# Patient Record
Sex: FEMALE | Race: WHITE | Employment: FULL TIME | URBAN - METROPOLITAN AREA
[De-identification: names, ages, dates, MRNs, and addresses within clinical notes are randomized per-mention and may not be internally consistent; named-entity substitution may affect disease eponyms.]

---

## 2017-07-27 ENCOUNTER — HOSPITAL ENCOUNTER (OUTPATIENT)
Dept: GENERAL RADIOLOGY | Age: 67
Discharge: HOME | End: 2017-07-27
Payer: COMMERCIAL

## 2017-07-27 DIAGNOSIS — R06.02 SOB (SHORTNESS OF BREATH): ICD-10-CM

## 2017-07-27 PROCEDURE — 71020 XR CHEST PA LAT: CPT

## 2018-04-12 ENCOUNTER — HOSPITAL ENCOUNTER (OUTPATIENT)
Dept: CT IMAGING | Age: 68
Discharge: HOME | End: 2018-04-12
Attending: OTOLARYNGOLOGY
Payer: COMMERCIAL

## 2018-04-12 DIAGNOSIS — J32.0 CHRONIC MAXILLARY SINUSITIS: ICD-10-CM

## 2018-04-12 PROCEDURE — 70486 CT MAXILLOFACIAL W/O DYE: CPT

## 2018-04-30 ENCOUNTER — OFFICE VISIT (OUTPATIENT)
Dept: NEUROLOGY | Age: 68
End: 2018-04-30

## 2018-04-30 VITALS
SYSTOLIC BLOOD PRESSURE: 130 MMHG | DIASTOLIC BLOOD PRESSURE: 80 MMHG | HEART RATE: 83 BPM | HEIGHT: 63 IN | BODY MASS INDEX: 26.22 KG/M2 | RESPIRATION RATE: 14 BRPM | WEIGHT: 148 LBS | OXYGEN SATURATION: 97 %

## 2018-04-30 DIAGNOSIS — R43.9: Primary | ICD-10-CM

## 2018-04-30 DIAGNOSIS — R43.2 DYSGEUSIA: ICD-10-CM

## 2018-04-30 RX ORDER — LEVOCETIRIZINE DIHYDROCHLORIDE 5 MG/1
TABLET, FILM COATED ORAL
COMMUNITY
End: 2020-11-19

## 2018-04-30 RX ORDER — DICLOFENAC SODIUM 10 MG/G
GEL TOPICAL 4 TIMES DAILY
COMMUNITY

## 2018-04-30 RX ORDER — ERGOCALCIFEROL 1.25 MG/1
50000 CAPSULE ORAL
COMMUNITY

## 2018-04-30 RX ORDER — DICYCLOMINE HYDROCHLORIDE 10 MG/1
10 CAPSULE ORAL
COMMUNITY

## 2018-04-30 RX ORDER — METFORMIN HYDROCHLORIDE 500 MG/1
500 TABLET ORAL
COMMUNITY

## 2018-04-30 RX ORDER — FLUTICASONE PROPIONATE AND SALMETEROL 250; 50 UG/1; UG/1
1 POWDER RESPIRATORY (INHALATION) EVERY 12 HOURS
Status: ON HOLD | COMMUNITY
End: 2020-11-20

## 2018-04-30 RX ORDER — TOPIRAMATE 25 MG/1
TABLET ORAL 2 TIMES DAILY
COMMUNITY
End: 2020-11-19

## 2018-04-30 RX ORDER — ONDANSETRON 4 MG/1
4 TABLET, ORALLY DISINTEGRATING ORAL
COMMUNITY
End: 2018-12-22

## 2018-04-30 RX ORDER — CYANOCOBALAMIN 1000 UG/ML
1000 INJECTION, SOLUTION INTRAMUSCULAR; SUBCUTANEOUS
COMMUNITY

## 2018-04-30 RX ORDER — LEVOTHYROXINE SODIUM 88 UG/1
75 TABLET ORAL
COMMUNITY

## 2018-04-30 RX ORDER — AZELASTINE HCL 205.5 UG/1
SPRAY NASAL 2 TIMES DAILY
COMMUNITY

## 2018-04-30 RX ORDER — ROSUVASTATIN CALCIUM 20 MG/1
20 TABLET, COATED ORAL
COMMUNITY

## 2018-04-30 RX ORDER — MOMETASONE FUROATE 50 UG/1
2 SPRAY, METERED NASAL DAILY
COMMUNITY

## 2018-04-30 NOTE — PROGRESS NOTES
Chief Complaint   Patient presents with    Neurologic Problem     Taste and smell changes       Referred by: Dr. Arik Hess is a 69-year-old woman with a history of prediabetes and thyroid dysfunction as well as migraine headaches who is here to discuss new changes. She was referred by Dr. Juan Knox in ENT. The patient tells me that for the past 6 months she has had a change in her taste and smell such that she constantly smells a moldy musty stale order at all times. She also feels like her taste is changed such that she no longer has pleasure eating. She thought it was her metformin at one point and had the medication change to no improvement. She saw ENT and has a deviated septum with recommendation for some mild sinus surgery. She denies any unusual headaches or vision changes or personality changes. No weakness in the arms or legs. Review of Systems   HENT:        Taste changes and smell changes   All other systems reviewed and are negative. Past Medical History:   Diagnosis Date    Asthma     Thyroid disease      Family History   Problem Relation Age of Onset    Cancer Mother     Heart Disease Mother      Social History     Social History    Marital status:      Spouse name: N/A    Number of children: N/A    Years of education: N/A     Occupational History    Not on file. Social History Main Topics    Smoking status: Never Smoker    Smokeless tobacco: Never Used    Alcohol use No    Drug use: Not on file    Sexual activity: Not on file     Other Topics Concern    Not on file     Social History Narrative    No narrative on file     Current Outpatient Prescriptions   Medication Sig    fluticasone-salmeterol (ADVAIR DISKUS) 250-50 mcg/dose diskus inhaler Take 1 Puff by inhalation every twelve (12) hours.  mometasone (NASONEX) 50 mcg/actuation nasal spray 2 Sprays daily.  levothyroxine (SYNTHROID) 88 mcg tablet Take  by mouth Daily (before breakfast).     rosuvastatin (CRESTOR) 20 mg tablet Take 20 mg by mouth nightly.  topiramate (TOPAMAX) 25 mg tablet Take  by mouth two (2) times a day.  metFORMIN (GLUCOPHAGE) 500 mg tablet Take  by mouth two (2) times daily (with meals).  dulaglutide (TRULICITY) 1.5 NIYAH/8.9 mL sub-q pen 1.5 mg by SubCUTAneous route every seven (7) days.  diclofenac (VOLTAREN) 1 % gel Apply  to affected area four (4) times daily.  ondansetron (ZOFRAN ODT) 4 mg disintegrating tablet Take 4 mg by mouth every eight (8) hours as needed for Nausea.  dicyclomine (BENTYL) 10 mg capsule Take 10 mg by mouth nightly as needed.  ergocalciferol (VITAMIN D2) 50,000 unit capsule Take 50,000 Units by mouth.  cyanocobalamin (VITAMIN B-12) 1,000 mcg/mL injection 1,000 mcg by IntraMUSCular route once.  levocetirizine (XYZAL) 5 mg tablet Take  by mouth.  Azelastine (ASTEPRO) 0.15 % (205.5 mcg) nasal spray two (2) times a day.  topiramate ER (TROKENDI XR) 100 mg capsule Take 1 Cap by mouth daily. No current facility-administered medications for this visit. Allergies   Allergen Reactions    Codeine Nausea and Vomiting    Penicillins Hives    Tetracyclines Hives         Neurologic Exam     Mental Status   Oriented to person, place, and time. Cranial Nerves   Cranial nerves II through XII intact. Motor Exam   Muscle bulk: normal    Strength   Strength 5/5 throughout. Sensory Exam   Light touch normal.     Gait, Coordination, and Reflexes     Gait  Gait: normal    Coordination   Romberg: negative    Tremor   Resting tremor: absent    Reflexes   Right brachioradialis: 2+  Left brachioradialis: 2+  Right biceps: 2+  Left biceps: 2+  Right triceps: 2+  Left triceps: 2+  Right patellar: 2+  Left patellar: 2+  Right achilles: 2+  Left achilles: 2+  Right : 2+  Left : 2+    Physical Exam   Constitutional: She is oriented to person, place, and time. She appears well-developed and well-nourished.    Cardiovascular: Normal rate. Pulmonary/Chest: Effort normal.   Neurological: She is oriented to person, place, and time. She has normal strength. She has a normal Romberg Test. Gait normal.   Reflex Scores:       Tricep reflexes are 2+ on the right side and 2+ on the left side. Bicep reflexes are 2+ on the right side and 2+ on the left side. Brachioradialis reflexes are 2+ on the right side and 2+ on the left side. Patellar reflexes are 2+ on the right side and 2+ on the left side. Achilles reflexes are 2+ on the right side and 2+ on the left side. Skin: Skin is warm and dry. Psychiatric: She has a normal mood and affect. Her behavior is normal.   Vitals reviewed. Visit Vitals    /80    Pulse 83    Resp 14    Ht 5' 3.25\" (1.607 m)    Wt 67.1 kg (148 lb)    SpO2 97%    BMI 26.01 kg/m2       Lab Results  Component Value Date/Time   WBC 5.3 02/15/2010 08:35 AM   HGB 12.2 02/15/2010 08:35 AM   HCT 38.4 02/15/2010 08:35 AM   PLATELET 957 01/37/6090 08:35 AM   MCV 90.8 02/15/2010 08:35 AM     Lab Results  Component Value Date/Time   Glucose 78 05/11/2009 07:59 PM   LDL, calculated 119.2 (H) 02/15/2010 08:35 AM   Creatinine (POC) 0.9 09/23/2015 08:14 AM   Creatinine 0.8 05/11/2009 07:59 PM      Lab Results  Component Value Date/Time   Cholesterol, total 221 (H) 02/15/2010 08:35 AM   HDL Cholesterol 69 (H) 02/15/2010 08:35 AM   LDL, calculated 119.2 (H) 02/15/2010 08:35 AM   Triglyceride 164 02/15/2010 08:35 AM   CHOL/HDL Ratio 3.2 02/15/2010 08:35 AM     Lab Results  Component Value Date/Time   ALT (SGPT) 37 05/11/2009 07:59 PM   AST (SGOT) 15 05/11/2009 07:59 PM   Alk. phosphatase 98 05/11/2009 07:59 PM   Bilirubin, total 0.4 05/11/2009 07:59 PM   Albumin 3.9 05/11/2009 07:59 PM   Protein, total 7.0 05/11/2009 07:59 PM   PLATELET 878 27/60/2534 08:35 AM          CT Results (maximum last 3):     Results from [facility] encounter on 04/12/18   CT SINUSES WO CONT   Narrative EXAM:  CT SINUSES WO CONT    INDICATION:  Chronic maxillary sinusitis    COMPARISON: None. CONTRAST: None. TECHNIQUE:   Multislice helical CT of the paranasal sinuses was performed in the axial plane  without intravenous contrast administration. Coronal and sagittal reformations  were generated. CT dose reduction was achieved through use of a standardized  protocol tailored for this examination and automatic exposure control for dose  modulation. FINDINGS:   There is a small osteoma in the left frontal sinus. The frontal sinuses and  frontoethmoidal recesses are otherwise clear. The anterior ethmoid air cells are clear. The maxillary sinuses are clear. The maxillary sinus infundibula and  ostiomeatal units are patent. The posterior ethmoid air cells and sphenoid sinus are clear. The nasal cavity is clear. The nasal septum is mildly deviated to the left. There is no bone destruction or bone dehiscence of the paranasal sinuses. Impression IMPRESSION: No evidence of acute or chronic sinusitis. Mild leftward deviation  of the nasal septum. Results from [place] encounter on 09/23/15   CT ABD PELV W CONT   Narrative **Final Report**      ICD Codes / Adm. Diagnosis: 787.91  789.04 / Diarrhea  Abdominal pain, left   lower felicitas  Examination:  CT ABD PELVIS W CON  - [id number] - Sep 23 2015  8:28AM  Accession No:  [id number]  Reason:  Diarrhea      REPORT:  Clinical indication: Diarrhea. Axial CT scan of the abdomen and pelvis obtained after intravenous injection   of 99 cc of Isovue-370 and after oral contrast, comparison to 2012   examination. Liver and spleen are normal in size. Gallbladder, pancreas and adrenals   appear unremarkable. There is no free air or free fluid, lung bases appear   unremarkable. Major vessels appear patent. Nonobstructing left renal   calculi. Unchanged large right central renal cyst. There is sigmoid   diverticulosis.  The appendix appears normal. There is no bowel wall   thickening or obstruction. There is no adenopathy in the abdomen or pelvis. The bladder is midline not filled. There has been a prior hysterectomy. IMPRESSION: Sigmoid diverticulosis. Nonobstructing left intrarenal calculi. Signing/Reading Doctor: Kateri Burkitt ([id number])    Approved: Kateri Burkitt ([id number])  Sep 23 2015  9:46AM                                     MRI Results (maximum last 3): No results found for this or any previous visit. PET Results (maximum last 3): No results found for this or any previous visit. Assessment and Plan   Diagnoses and all orders for this visit:    1. Olfaction disorder    2. Dysgeusia    Other orders  -     topiramate ER (TROKENDI XR) 100 mg capsule; Take 1 Cap by mouth daily. 48-year-old woman having change in smell as well as taste which makes be suspicious for possibly medication effect from topiramate  Which is well known to cause alteration in taste in a negative way. I am going to give her a trial of extended release Trokendi once a day at this point and see if this improves any of her symptoms by removing the generic topiramate. Her examination is without any focal findings. I think it is reasonable to see how she responds to medication change before considering imaging. She agrees. She will let me know in the next 7-10 days if there is been any change. A notice of this visit/encounter being completed has been sent electronically to the patient's PCP and/or referring provider.      [address] [place] [address], [address]  Diplomate KATI

## 2018-04-30 NOTE — PATIENT INSTRUCTIONS

## 2018-12-22 ENCOUNTER — HOSPITAL ENCOUNTER (EMERGENCY)
Age: 68
Discharge: HOME | End: 2018-12-22
Attending: EMERGENCY MEDICINE
Payer: COMMERCIAL

## 2018-12-22 ENCOUNTER — APPOINTMENT (OUTPATIENT)
Dept: CT IMAGING | Age: 68
End: 2018-12-22
Payer: COMMERCIAL

## 2018-12-22 VITALS
HEART RATE: 66 BPM | DIASTOLIC BLOOD PRESSURE: 76 MMHG | HEIGHT: 63 IN | RESPIRATION RATE: 16 BRPM | TEMPERATURE: 98.2 F | BODY MASS INDEX: 24.8 KG/M2 | WEIGHT: 139.99 LBS | OXYGEN SATURATION: 99 % | SYSTOLIC BLOOD PRESSURE: 119 MMHG

## 2018-12-22 DIAGNOSIS — S09.90XA INJURY OF HEAD, INITIAL ENCOUNTER: Primary | ICD-10-CM

## 2018-12-22 DIAGNOSIS — F07.81 POST CONCUSSION SYNDROME: ICD-10-CM

## 2018-12-22 PROCEDURE — 74011250636 HC RX REV CODE- 250/636: Performed by: PHYSICIAN ASSISTANT

## 2018-12-22 PROCEDURE — 74011250637 HC RX REV CODE- 250/637: Performed by: PHYSICIAN ASSISTANT

## 2018-12-22 PROCEDURE — 99283 EMERGENCY DEPT VISIT LOW MDM: CPT

## 2018-12-22 PROCEDURE — 70450 CT HEAD/BRAIN W/O DYE: CPT

## 2018-12-22 RX ORDER — ONDANSETRON 4 MG/1
4 TABLET, ORALLY DISINTEGRATING ORAL
Status: COMPLETED | OUTPATIENT
Start: 2018-12-22 | End: 2018-12-22

## 2018-12-22 RX ORDER — MECLIZINE HCL 12.5 MG 12.5 MG/1
25 TABLET ORAL
Status: COMPLETED | OUTPATIENT
Start: 2018-12-22 | End: 2018-12-22

## 2018-12-22 RX ORDER — ONDANSETRON 4 MG/1
4 TABLET, ORALLY DISINTEGRATING ORAL
Qty: 10 TAB | Refills: 0 | Status: SHIPPED | OUTPATIENT
Start: 2018-12-22

## 2018-12-22 RX ORDER — BUTALBITAL, ACETAMINOPHEN AND CAFFEINE 50; 325; 40 MG/1; MG/1; MG/1
1 TABLET ORAL
Status: COMPLETED | OUTPATIENT
Start: 2018-12-22 | End: 2018-12-22

## 2018-12-22 RX ORDER — MECLIZINE HYDROCHLORIDE 25 MG/1
25 TABLET ORAL
Qty: 15 TAB | Refills: 0 | Status: SHIPPED | OUTPATIENT
Start: 2018-12-22 | End: 2020-11-19

## 2018-12-22 RX ORDER — AMITRIPTYLINE HYDROCHLORIDE 25 MG/1
TABLET, FILM COATED ORAL
Qty: 15 TAB | Refills: 0 | Status: SHIPPED | OUTPATIENT
Start: 2018-12-22 | End: 2020-11-19

## 2018-12-22 RX ORDER — IBUPROFEN 600 MG/1
600 TABLET ORAL
Qty: 15 TAB | Refills: 0 | Status: SHIPPED | OUTPATIENT
Start: 2018-12-22 | End: 2020-11-19

## 2018-12-22 RX ADMIN — MECLIZINE 25 MG: 12.5 TABLET ORAL at 13:26

## 2018-12-22 RX ADMIN — ONDANSETRON 4 MG: 4 TABLET, ORALLY DISINTEGRATING ORAL at 12:12

## 2018-12-22 RX ADMIN — BUTALBITAL, ACETAMINOPHEN AND CAFFEINE 1 TABLET: 50; 325; 40 TABLET ORAL at 12:12
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
## 2018-12-22 NOTE — DISCHARGE INSTRUCTIONS
Rest, push fluids, ice/elevation as tolerated. Follow up with your primary care for recheck. Contact information provided for Neurologist, if symptoms persist.  Return to the Emergency Dept for any worsening headache, confusion, nausea/vomiting, visual changes, or weakness. Learning About a Closed Head Injury  What is a closed head injury? A closed head injury happens when your head gets hit hard. The strong force of the blow causes your brain to shake in your skull. This movement can cause the brain to bruise, swell, or tear. Sometimes nerves or blood vessels also get damaged. This can cause bleeding in or around the brain. A concussion is a type of closed head injury. What are the symptoms? If you have a mild concussion, you may have a mild headache or feel \"not quite right. \" These symptoms are common. They usually go away over a few days to 4 weeks. But sometimes after a concussion, you feel like you can't function as well as before the injury. And you have new symptoms. This is called postconcussive syndrome. You may:  · Find it harder to solve problems, think, concentrate, or remember. · Have headaches. · Have changes in your sleep patterns, such as not being able to sleep or sleeping all the time. · Have changes in your personality. · Not be interested in your usual activities. · Feel angry or anxious without a clear reason. · Lose your sense of taste or smell. · Be dizzy, lightheaded, or unsteady. It may be hard to stand or walk. How is a closed head injury treated? Any person who may have a concussion needs to see a doctor. Some people have to stay in the hospital to be watched. Others can go home safely. If you go home, follow your doctor's instructions. He or she will tell you if you need someone to watch you closely for the next 24 hours or longer. Rest is the best treatment. Get plenty of sleep at night. And try to rest during the day.   · Avoid activities that are physically or mentally demanding. These include housework, exercise, and schoolwork. And don't play video games, send text messages, or use the computer. You may need to change your school or work schedule to be able to avoid these activities. · Ask your doctor when it's okay to drive, ride a bike, or operate machinery. · Take an over-the-counter pain medicine, such as acetaminophen (Tylenol), ibuprofen (Advil, Motrin), or naproxen (Aleve). Be safe with medicines. Read and follow all instructions on the label. · Check with your doctor before you use any other medicines for pain. · Do not drink alcohol or use illegal drugs. They can slow recovery. They can also increase your risk of getting a second head injury. Follow-up care is a key part of your treatment and safety. Be sure to make and go to all appointments, and call your doctor if you are having problems. It's also a good idea to know your test results and keep a list of the medicines you take. Where can you learn more? Go to http://silvia-onur.info/. Enter E235 in the search box to learn more about \"Learning About a Closed Head Injury. \"  Current as of: June 4, 2018  Content Version: 11.8  © 4043-2072 [employer]. Care instructions adapted under license by [employer] (which disclaims liability or warranty for this information). If you have questions about a medical condition or this instruction, always ask your healthcare professional. [place] [address] any warranty or liability for your use of this information. Postconcussion Syndrome: Care Instructions  Your Care Instructions    Postconcussion syndrome occurs after a blow to the head or body. Common symptoms are changes in the ability to concentrate, think, remember, or solve problems.  Symptoms, which may include headaches, personality changes, and dizziness, may be related to stress from the events surrounding the accident that caused the injury. Follow-up care is a key part of your treatment and safety. Be sure to make and go to all appointments, and call your doctor if you are having problems. It's also a good idea to know your test results and keep a list of the medicines you take. How can you care for yourself at home? Pain  · Rest is the best treatment for postconcussion syndrome. · Do not drive if you have taken a prescription pain medicine. · Rest in a quiet, dark room until your headache is gone. Close your eyes and try to relax or go to sleep. Do not watch TV or read. · Put a cold, moist cloth or cold pack on the painful area for 10 to 20 minutes at a time. Put a thin cloth between the cold pack and your skin. · Have someone gently massage your neck and shoulders. · Take your medicines exactly as prescribed. Call your doctor if you think you are having a problem with your medicine. You will get more details on the specific medicines your doctor prescribes. Stress  · Try to reduce stress. Some ways to do this include:  ? Taking slow, deep breaths. ? Soaking in a warm bath. ? Listening to soothing music. ? Taking a yoga class. ? Having a massage or back rub. ? Drinking a warm, nonalcoholic, noncaffeinated beverage. · Get enough sleep. · Eat a healthy, balanced diet. A balanced diet includes whole grains, dairy, fruits and vegetables, and protein. Eat a variety of foods from each of those groups so you get all the nutrients you need. · Avoid alcohol and illegal drugs. · Try relaxation exercises, such as breathing and muscle relaxation exercises. · Talk to your doctor about counseling. It may help you deal with stress from your accident. When should you call for help? Watch closely for changes in your health, and be sure to contact your doctor if:    · You do not get better as expected.     · Your symptoms, such as headaches, trouble concentrating, or changes in mood, get worse. Where can you learn more?   Go to http://silvia-onur.info/. Enter R292 in the search box to learn more about \"Postconcussion Syndrome: Care Instructions. \"  Current as of: September 10, 2017  Content Version: 11.8  © 9192-6924 [employer]. Care instructions adapted under license by [employer] (which disclaims liability or warranty for this information). If you have questions about a medical condition or this instruction, always ask your healthcare professional. [address] any warranty or liability for your use of this information.

## 2020-11-16 ENCOUNTER — HOSPITAL ENCOUNTER (OUTPATIENT)
Dept: PREADMISSION TESTING | Age: 70
Discharge: HOME | End: 2020-11-16
Payer: MEDICARE

## 2020-11-16 PROCEDURE — 87635 SARS-COV-2 COVID-19 AMP PRB: CPT

## 2020-11-17 LAB
HEALTH STATUS, XMCV2T: NORMAL
SARS-COV-2, COV2NT: NOT DETECTED
SOURCE, COVRS: NORMAL
SPECIMEN SOURCE, FCOV2M: NORMAL
SPECIMEN TYPE, XMCV1T: NORMAL

## 2020-11-19 RX ORDER — OMEPRAZOLE 40 MG/1
40 CAPSULE, DELAYED RELEASE ORAL DAILY
Status: ON HOLD | COMMUNITY
End: 2020-11-20

## 2020-11-19 RX ORDER — FREMANEZUMAB-VFRM 225 MG/1.5ML
INJECTION SUBCUTANEOUS
COMMUNITY

## 2020-11-19 RX ORDER — ESTRADIOL 0.1 MG/D
1 FILM, EXTENDED RELEASE TRANSDERMAL
COMMUNITY

## 2020-11-19 RX ORDER — CHOLECALCIFEROL (VITAMIN D3) 125 MCG
100 CAPSULE ORAL DAILY
COMMUNITY

## 2020-11-19 RX ORDER — FLUTICASONE PROPIONATE 100 UG/1
POWDER, METERED RESPIRATORY (INHALATION) 2 TIMES DAILY
COMMUNITY

## 2020-11-19 RX ORDER — ESTRADIOL 0.03 MG/D
FILM, EXTENDED RELEASE TRANSDERMAL
COMMUNITY

## 2020-11-20 ENCOUNTER — ANESTHESIA (OUTPATIENT)
Dept: ENDOSCOPY | Age: 70
End: 2020-11-20
Payer: MEDICARE

## 2020-11-20 ENCOUNTER — HOSPITAL ENCOUNTER (OUTPATIENT)
Age: 70
Setting detail: OUTPATIENT SURGERY
Discharge: HOME | End: 2020-11-20
Attending: SPECIALIST | Admitting: SPECIALIST
Payer: MEDICARE

## 2020-11-20 ENCOUNTER — ANESTHESIA EVENT (OUTPATIENT)
Dept: ENDOSCOPY | Age: 70
End: 2020-11-20
Payer: MEDICARE

## 2020-11-20 VITALS
OXYGEN SATURATION: 100 % | BODY MASS INDEX: 23.39 KG/M2 | HEIGHT: 63 IN | HEART RATE: 83 BPM | WEIGHT: 132 LBS | RESPIRATION RATE: 14 BRPM | DIASTOLIC BLOOD PRESSURE: 73 MMHG | TEMPERATURE: 98 F | SYSTOLIC BLOOD PRESSURE: 126 MMHG

## 2020-11-20 PROCEDURE — 2709999900 HC NON-CHARGEABLE SUPPLY: Performed by: SPECIALIST

## 2020-11-20 PROCEDURE — 74011250636 HC RX REV CODE- 250/636: Performed by: SPECIALIST

## 2020-11-20 PROCEDURE — 88305 TISSUE EXAM BY PATHOLOGIST: CPT

## 2020-11-20 PROCEDURE — 74011000250 HC RX REV CODE- 250: Performed by: REGISTERED NURSE

## 2020-11-20 PROCEDURE — 76060000031 HC ANESTHESIA FIRST 0.5 HR: Performed by: SPECIALIST

## 2020-11-20 PROCEDURE — 74011250636 HC RX REV CODE- 250/636: Performed by: REGISTERED NURSE

## 2020-11-20 PROCEDURE — 76040000019: Performed by: SPECIALIST

## 2020-11-20 PROCEDURE — 77030013992 HC SNR POLYP ENDOSC BSC -B: Performed by: SPECIALIST

## 2020-11-20 RX ORDER — PANTOPRAZOLE SODIUM 20 MG/1
20 TABLET, DELAYED RELEASE ORAL DAILY
COMMUNITY

## 2020-11-20 RX ORDER — DEXTROMETHORPHAN/PSEUDOEPHED 2.5-7.5/.8
1.2 DROPS ORAL
Status: DISCONTINUED | OUTPATIENT
Start: 2020-11-20 | End: 2020-11-20 | Stop reason: HOSPADM

## 2020-11-20 RX ORDER — PROPOFOL 10 MG/ML
INJECTION, EMULSION INTRAVENOUS AS NEEDED
Status: DISCONTINUED | OUTPATIENT
Start: 2020-11-20 | End: 2020-11-20 | Stop reason: HOSPADM

## 2020-11-20 RX ORDER — SODIUM CHLORIDE 9 MG/ML
50 INJECTION, SOLUTION INTRAVENOUS CONTINUOUS
Status: DISCONTINUED | OUTPATIENT
Start: 2020-11-20 | End: 2020-11-20 | Stop reason: HOSPADM

## 2020-11-20 RX ORDER — SODIUM CHLORIDE 0.9 % (FLUSH) 0.9 %
5-40 SYRINGE (ML) INJECTION AS NEEDED
Status: DISCONTINUED | OUTPATIENT
Start: 2020-11-20 | End: 2020-11-20 | Stop reason: HOSPADM

## 2020-11-20 RX ORDER — SODIUM CHLORIDE 0.9 % (FLUSH) 0.9 %
5-40 SYRINGE (ML) INJECTION EVERY 8 HOURS
Status: DISCONTINUED | OUTPATIENT
Start: 2020-11-20 | End: 2020-11-20 | Stop reason: HOSPADM

## 2020-11-20 RX ORDER — LIDOCAINE HYDROCHLORIDE 20 MG/ML
INJECTION, SOLUTION EPIDURAL; INFILTRATION; INTRACAUDAL; PERINEURAL AS NEEDED
Status: DISCONTINUED | OUTPATIENT
Start: 2020-11-20 | End: 2020-11-20 | Stop reason: HOSPADM

## 2020-11-20 RX ADMIN — LIDOCAINE HYDROCHLORIDE 40 MG: 20 INJECTION, SOLUTION EPIDURAL; INFILTRATION; INTRACAUDAL; PERINEURAL at 08:34

## 2020-11-20 RX ADMIN — SODIUM CHLORIDE: 900 INJECTION, SOLUTION INTRAVENOUS at 08:35

## 2020-11-20 RX ADMIN — PROPOFOL 30 MG: 10 INJECTION, EMULSION INTRAVENOUS at 08:43

## 2020-11-20 RX ADMIN — PROPOFOL 60 MG: 10 INJECTION, EMULSION INTRAVENOUS at 08:34

## 2020-11-20 RX ADMIN — PROPOFOL 20 MG: 10 INJECTION, EMULSION INTRAVENOUS at 08:36

## 2020-11-20 RX ADMIN — PROPOFOL 30 MG: 10 INJECTION, EMULSION INTRAVENOUS at 08:40

## 2020-11-20 RX ADMIN — PROPOFOL 20 MG: 10 INJECTION, EMULSION INTRAVENOUS at 08:48

## 2020-11-20 RX ADMIN — PROPOFOL 20 MG: 10 INJECTION, EMULSION INTRAVENOUS at 08:38

## 2020-11-20 RX ADMIN — PROPOFOL 20 MG: 10 INJECTION, EMULSION INTRAVENOUS at 08:52

## 2020-11-20 RX ADMIN — PROPOFOL 20 MG: 10 INJECTION, EMULSION INTRAVENOUS at 08:45

## 2020-11-20 NOTE — ROUTINE PROCESS
[facility] 1950 
[id number] Situation: 
Verbal report received from: Nia Rueda RN Procedure: Procedure(s): 
COLONOSCOPY 
ENDOSCOPIC POLYPECTOMY Background: 
 
Preoperative diagnosis: CHRONIC CONSTIPATION Postoperative diagnosis: diverticulosis, polyps :  Dr. Verónica Driscoll Assistant(s): Endoscopy Technician-1: Rodri Grier Endoscopy RN-1: Adriana Hamilton RN Specimens:  
ID Type Source Tests Collected by Time Destination 1 : transverse colon polyps  Preservative   Corazon Wilson MD 2020 5280 Pathology 2 : proximal ascending colon polyp Preservative Colon, Ascending  Corazon Wilson MD 2020 3301 Pathology 3 : splenic flexure polyp Preservative   Corazon Wilson MD 2020 1972 Pathology H. Pylori  no Assessment: 
Intra-procedure medications Anesthesia gave intra-procedure sedation and medications, see anesthesia flow sheet yes Intravenous fluids: NS@ Yeniferadrian Gant Vital signs stable Abdominal assessment: round and soft Recommendation: 
Discharge patient per MD order. Family or Friend Permission to share finding with family or friend yes

## 2020-11-20 NOTE — ANESTHESIA POSTPROCEDURE EVALUATION
Procedure(s):  COLONOSCOPY  ENDOSCOPIC POLYPECTOMY. total IV anesthesia, general    Anesthesia Post Evaluation        Patient location during evaluation: PACU  Note status: Adequate. Level of consciousness: responsive to verbal stimuli and sleepy but conscious  Pain management: satisfactory to patient  Airway patency: patent  Anesthetic complications: no  Cardiovascular status: acceptable  Respiratory status: acceptable  Hydration status: acceptable  Comments: +Post-Anesthesia Evaluation and Assessment    Patient: Isaac Torres MRN: [id number]  SSN: [id number]   YOB: 1950  Age: 69 y.o. Sex: female      Cardiovascular Function/Vital Signs    /73   Pulse 83   Temp 36.7 °C (98 °F)   Resp 14   Ht 5' 3.25\" (1.607 m)   Wt 59.9 kg (132 lb)   SpO2 100%   Breastfeeding No   BMI 23.20 kg/m²     Patient is status post Procedure(s):  COLONOSCOPY  ENDOSCOPIC POLYPECTOMY. Nausea/Vomiting: Controlled. Postoperative hydration reviewed and adequate. Pain:  Pain Scale 1: Numeric (0 - 10) (11/20/20 6621)  Pain Intensity 1: 0 (11/20/20 7365)   Managed. Neurological Status: At baseline. Mental Status and Level of Consciousness: Arousable. Pulmonary Status:   O2 Device: Room air (11/20/20 6324)   Adequate oxygenation and airway patent. Complications related to anesthesia: None    Post-anesthesia assessment completed. No concerns. Signed By: Monica Uribe MD    11/20/2020  Post anesthesia nausea and vomiting:  controlled      INITIAL Post-op Vital signs:   Vitals Value Taken Time   /70 11/20/2020  9:21 AM   Temp     Pulse 79 11/20/2020  9:22 AM   Resp 16 11/20/2020  9:22 AM   SpO2 100 % 11/20/2020  9:22 AM   Vitals shown include unvalidated device data.

## 2020-11-20 NOTE — PROCEDURES
Colonoscopy Procedure Note    Indications:   Personal history of colon polyps (screening only)    Referring Physician: Rody Lugo MD  Anesthesia/Sedation: MAC anesthesia Propofol  Endoscopist:  Dr. Yasmani Osuna    Procedure in Detail:  Informed consent was obtained for the procedure, including sedation. Risks of perforation, hemorrhage, adverse drug reaction, and aspiration were discussed. The patient was placed in the left lateral decubitus position. Based on the pre-procedure assessment, including review of the patient's medical history, medications, allergies, and review of systems, she had been deemed to be an appropriate candidate for moderate sedation; she was therefore sedated with the medications listed above. The patient was monitored continuously with ECG tracing, pulse oximetry, blood pressure monitoring, and direct observations. A rectal examination was performed. The [id number] was inserted into the rectum and advanced under direct vision to the cecum, which was identified by the ileocecal valve and appendiceal orifice. The quality of the colonic preparation was adequate. A careful inspection was made as the colonoscope was withdrawn, including a retroflexed view of the rectum; findings and interventions are described below. Appropriate photodocumentation was obtained. Findings:     1. Scope advanced to the cecum and terminal ileum. 2.  (2) sessile polyps in the transverse colon 5 mm in size s/p cold snare removal       (1) sessile 1 cm polyp in the proximal ascending colon s/p hot snare removal       (1) sessile polyp 4 mm in the area of splenic flexure s/p cold snare removal  3. Diffuse L sided diverticulosis. 4.  Small internal hemorrhoids. Therapies:  See above    Specimen: Specimens were collected as described above and sent to pathology. Complications: None were encountered during the procedure. EBL: < 10 ml.     Recommendations:   -f/u path  -repeat colonoscopy in 3 years    Signed By: Mattie Siddiqi MD                        November 20, 2020

## 2020-11-20 NOTE — H&P
Gastroenterology Outpatient History and Physical    Patient: Americo Pradhanorest    Physician: Reno Mccall MD    Vital Signs: Blood pressure 113/70, pulse 90, temperature 98 °F (36.7 °C), resp. rate 23, height 5' 3.25\" (1.607 m), weight 59.9 kg (132 lb), SpO2 99 %, not currently breastfeeding. Allergies: Allergies   Allergen Reactions    Codeine Nausea and Vomiting    Penicillins Hives    Saline Spray [Sodium Chloride] Other (comments)     Causes sneezing    Tetracyclines Hives       Chief Complaint: H/O Polyp    History of Present Illness: 68 yo WF for h/o polyps    Justification for Procedure: above    History:  Past Medical History:   Diagnosis Date    Arthritis     Asthma     GERD (gastroesophageal reflux disease)     Ill-defined condition     chronic migraines    Nausea & vomiting     Thyroid disease       Past Surgical History:   Procedure Laterality Date    HX GYN      hysterectomy    HX HEENT      septal tubinplasty    HX OTHER SURGICAL      piliondial cyst      Social History     Socioeconomic History    Marital status:      Spouse name: Not on file    Number of children: Not on file    Years of education: Not on file    Highest education level: Not on file   Tobacco Use    Smoking status: Never Smoker    Smokeless tobacco: Never Used   Substance and Sexual Activity    Alcohol use: Yes     Comment: socially    Drug use: Never      Family History   Problem Relation Age of Onset    Cancer Mother     Heart Disease Mother        Medications:   Prior to Admission medications    Medication Sig Start Date End Date Taking? Authorizing Provider   pantoprazole (PROTONIX) 20 mg tablet Take 20 mg by mouth daily. Yes Provider, Historical   fluticasone propionate (Flovent Diskus) 100 mcg/actuation dsdv Take  by inhalation two (2) times a day. Yes Provider, Historical   OTHER 1 Tab daily.  vitafusion   Yes Provider, Historical   co-enzyme Q-10 (CO Q-10) 100 mg capsule Take 100 mg by mouth daily. Yes Provider, Historical   fremanezumab-vfrm (Ajovy Syringe) 225 mg/1.5 mL syrg by SubCUTAneous route. Yes Provider, Historical   estradioL 0.025 mg/24 hr ptsw by TransDERmal route. Yes Provider, Historical   estradioL (VIVELLE) 0.1 mg/24 hr 1 Patch by TransDERmal route two (2) days a week. Yes Provider, Historical   empagliflozin/metformin HCl (SYNJARDY PO) Take  by mouth. Yes Other, MD Andrea   ondansetron (ZOFRAN ODT) 4 mg disintegrating tablet Take 1 Tab by mouth every eight (8) hours as needed for Nausea for up to 10 doses. 12/22/18  Yes Zeus Yuen PA   mometasone (NASONEX) 50 mcg/actuation nasal spray 2 Sprays daily. Yes Provider, Historical   levothyroxine (SYNTHROID) 88 mcg tablet Take 75 mcg by mouth Daily (before breakfast). Yes Provider, Historical   rosuvastatin (CRESTOR) 20 mg tablet Take 20 mg by mouth nightly. Yes Provider, Historical   metFORMIN (GLUCOPHAGE) 500 mg tablet Take 500 mg by mouth daily (with breakfast). Yes Provider, Historical   dulaglutide (TRULICITY) 1.5 VN/8.7 mL sub-q pen 1.5 mg by SubCUTAneous route every ten (10) days. Yes Provider, Historical   diclofenac (VOLTAREN) 1 % gel Apply  to affected area four (4) times daily. Yes Provider, Historical   ergocalciferol (VITAMIN D2) 50,000 unit capsule Take 50,000 Units by mouth every ten (10) days. Yes Provider, Historical   cyanocobalamin (VITAMIN B-12) 1,000 mcg/mL injection 1,000 mcg by IntraMUSCular route every seven (7) days. Yes Provider, Historical   dicyclomine (BENTYL) 10 mg capsule Take 10 mg by mouth nightly as needed. Provider, Historical   Azelastine (ASTEPRO) 0.15 % (205.5 mcg) nasal spray two (2) times a day. Provider, Historical       Physical Exam:   General: alert, no distress   HEENT: Head: Normocephalic, no lesions, without obvious abnormality.    Heart: regular rate and rhythm, S1, S2 normal, no murmur, click, rub or gallop   Lungs: chest clear, no wheezing, rales, normal symmetric air entry   Abdominal: soft, NT/ND + BS   Neurological: Grossly normal   Extremities: extremities normal, atraumatic, no cyanosis or edema     Findings/Diagnosis: H/O Polyps    Plan of Care/Planned Procedure: Colonoscopy

## 2020-11-20 NOTE — PROGRESS NOTES
6274  Endoscope was pre-cleaned at bedside immediately following procedure by Joao Ogden endo tech. [id number]   Received report from anesthesia. Assumed pt care. VSS.

## 2020-11-20 NOTE — ANESTHESIA PREPROCEDURE EVALUATION
Relevant Problems   No relevant active problems       Anesthetic History     PONV          Review of Systems / Medical History  Patient summary reviewed, nursing notes reviewed and pertinent labs reviewed    Pulmonary            Asthma        Neuro/Psych   Within defined limits           Cardiovascular  Within defined limits                Exercise tolerance: >4 METS     GI/Hepatic/Renal     GERD           Endo/Other      Hypothyroidism  Arthritis     Other Findings              Physical Exam    Airway  Mallampati: II  TM Distance: 4 - 6 cm  Neck ROM: normal range of motion   Mouth opening: Normal     Cardiovascular  Regular rate and rhythm,  S1 and S2 normal,  no murmur, click, rub, or gallop             Dental  No notable dental hx       Pulmonary  Breath sounds clear to auscultation               Abdominal  GI exam deferred       Other Findings            Anesthetic Plan    ASA: 2  Anesthesia type: total IV anesthesia and MAC          Induction: Intravenous  Anesthetic plan and risks discussed with: Patient

## 2020-11-20 NOTE — DISCHARGE INSTRUCTIONS
Jose E Russell  [id number]  1950    COLON DISCHARGE INSTRUCTIONS  Discomfort:  Redness at IV site- apply warm compress to area; if redness or soreness persist- contact your physician  There may be a slight amount of blood passed from the rectum  Gaseous discomfort- walking, belching will help relieve any discomfort  You may not operate a vehicle for 12 hours  You may not engage in an occupation involving machinery or appliances for rest of today  You may not drink alcoholic beverages for at least 12 hours  Avoid making any critical decisions for at least 24 hour  DIET:   Regular diet. - however -  remember your colon is empty and a heavy meal will produce gas. Avoid these foods:  vegetables, fried / greasy foods, carbonated drinks for today. MEDICATIONS:        Regarding Aspirin or Nonsteroidal medications, please see below. ACTIVITY:  You may resume your normal daily activities it is recommended that you spend the remainder of the day resting -  avoid any strenuous activity. CALL M.D. ANY SIGN OF:  Increasing pain, nausea, vomiting  Abdominal distension (swelling)  New increased bleeding (oral or rectal)  Fever (chills)  Pain in chest area  Bloody discharge from nose or mouth  Shortness of breath    ONLY  Tylenol as needed for pain.       Follow-up Instructions:   Call Dr. Nilesh Marie for results of procedure / biopsy in 4-5 days at telephone #  [phone]              Repeat Colonoscopy in 3 years

## 2023-01-10 ENCOUNTER — APPOINTMENT (OUTPATIENT)
Dept: GENERAL RADIOLOGY | Age: 73
End: 2023-01-10
Attending: EMERGENCY MEDICINE
Payer: MEDICARE

## 2023-01-10 ENCOUNTER — APPOINTMENT (OUTPATIENT)
Dept: CT IMAGING | Age: 73
End: 2023-01-10
Attending: EMERGENCY MEDICINE
Payer: MEDICARE

## 2023-01-10 ENCOUNTER — HOSPITAL ENCOUNTER (EMERGENCY)
Age: 73
Discharge: HOME | End: 2023-01-10
Attending: EMERGENCY MEDICINE
Payer: MEDICARE

## 2023-01-10 VITALS
WEIGHT: 129.5 LBS | OXYGEN SATURATION: 96 % | TEMPERATURE: 97.9 F | HEART RATE: 72 BPM | HEIGHT: 63 IN | SYSTOLIC BLOOD PRESSURE: 101 MMHG | BODY MASS INDEX: 22.95 KG/M2 | RESPIRATION RATE: 14 BRPM | DIASTOLIC BLOOD PRESSURE: 64 MMHG

## 2023-01-10 DIAGNOSIS — R07.9 ACUTE CHEST PAIN: Primary | ICD-10-CM

## 2023-01-10 LAB
ALBUMIN SERPL-MCNC: 4 G/DL (ref 3.5–5)
ALBUMIN/GLOB SERPL: 1.3 (ref 1.1–2.2)
ALP SERPL-CCNC: 53 U/L (ref 45–117)
ALT SERPL-CCNC: 23 U/L (ref 12–78)
ANION GAP SERPL CALC-SCNC: 5 MMOL/L (ref 5–15)
AST SERPL-CCNC: 20 U/L (ref 15–37)
BASOPHILS # BLD: 0 K/UL (ref 0–0.1)
BASOPHILS NFR BLD: 0 % (ref 0–1)
BILIRUB SERPL-MCNC: 0.5 MG/DL (ref 0.2–1)
BNP SERPL-MCNC: 79 PG/ML
BUN SERPL-MCNC: 18 MG/DL (ref 6–20)
BUN/CREAT SERPL: 18 (ref 12–20)
CALCIUM SERPL-MCNC: 9.7 MG/DL (ref 8.5–10.1)
CHLORIDE SERPL-SCNC: 104 MMOL/L (ref 97–108)
CO2 SERPL-SCNC: 29 MMOL/L (ref 21–32)
CREAT SERPL-MCNC: 0.98 MG/DL (ref 0.55–1.02)
DIFFERENTIAL METHOD BLD: NORMAL
EOSINOPHIL # BLD: 0 K/UL (ref 0–0.4)
EOSINOPHIL NFR BLD: 1 % (ref 0–7)
ERYTHROCYTE [DISTWIDTH] IN BLOOD BY AUTOMATED COUNT: 13.4 % (ref 11.5–14.5)
GLOBULIN SER CALC-MCNC: 3.2 G/DL (ref 2–4)
GLUCOSE SERPL-MCNC: 92 MG/DL (ref 65–100)
HCT VFR BLD AUTO: 41.7 % (ref 35–47)
HGB BLD-MCNC: 13 G/DL (ref 11.5–16)
IMM GRANULOCYTES # BLD AUTO: 0 K/UL (ref 0–0.04)
IMM GRANULOCYTES NFR BLD AUTO: 0 % (ref 0–0.5)
LYMPHOCYTES # BLD: 2.2 K/UL (ref 0.8–3.5)
LYMPHOCYTES NFR BLD: 31 % (ref 12–49)
MCH RBC QN AUTO: 28.8 PG (ref 26–34)
MCHC RBC AUTO-ENTMCNC: 31.2 G/DL (ref 30–36.5)
MCV RBC AUTO: 92.3 FL (ref 80–99)
MONOCYTES # BLD: 0.5 K/UL (ref 0–1)
MONOCYTES NFR BLD: 7 % (ref 5–13)
NEUTS SEG # BLD: 4.3 K/UL (ref 1.8–8)
NEUTS SEG NFR BLD: 61 % (ref 32–75)
NRBC # BLD: 0 K/UL (ref 0–0.01)
NRBC BLD-RTO: 0 PER 100 WBC
PLATELET # BLD AUTO: 266 K/UL (ref 150–400)
PMV BLD AUTO: 8.9 FL (ref 8.9–12.9)
POTASSIUM SERPL-SCNC: 4.2 MMOL/L (ref 3.5–5.1)
PROT SERPL-MCNC: 7.2 G/DL (ref 6.4–8.2)
RBC # BLD AUTO: 4.52 M/UL (ref 3.8–5.2)
SODIUM SERPL-SCNC: 138 MMOL/L (ref 136–145)
TROPONIN-HIGH SENSITIVITY: 4 NG/L (ref 0–51)
TROPONIN-HIGH SENSITIVITY: 5 NG/L (ref 0–51)
WBC # BLD AUTO: 7 K/UL (ref 3.6–11)

## 2023-01-10 PROCEDURE — 36415 COLL VENOUS BLD VENIPUNCTURE: CPT

## 2023-01-10 PROCEDURE — 71045 X-RAY EXAM CHEST 1 VIEW: CPT

## 2023-01-10 PROCEDURE — 85025 COMPLETE CBC W/AUTO DIFF WBC: CPT

## 2023-01-10 PROCEDURE — 74011000636 HC RX REV CODE- 636: Performed by: EMERGENCY MEDICINE

## 2023-01-10 PROCEDURE — 93005 ELECTROCARDIOGRAM TRACING: CPT

## 2023-01-10 PROCEDURE — 99285 EMERGENCY DEPT VISIT HI MDM: CPT

## 2023-01-10 PROCEDURE — 74011000250 HC RX REV CODE- 250: Performed by: EMERGENCY MEDICINE

## 2023-01-10 PROCEDURE — 80053 COMPREHEN METABOLIC PANEL: CPT

## 2023-01-10 PROCEDURE — 84484 ASSAY OF TROPONIN QUANT: CPT

## 2023-01-10 PROCEDURE — 83880 ASSAY OF NATRIURETIC PEPTIDE: CPT

## 2023-01-10 PROCEDURE — 71275 CT ANGIOGRAPHY CHEST: CPT

## 2023-01-10 PROCEDURE — 74011250637 HC RX REV CODE- 250/637: Performed by: EMERGENCY MEDICINE

## 2023-01-10 RX ORDER — FAMOTIDINE 20 MG/1
20 TABLET, FILM COATED ORAL
Status: COMPLETED | OUTPATIENT
Start: 2023-01-10 | End: 2023-01-10

## 2023-01-10 RX ORDER — SUCRALFATE 1 G/10ML
1 SUSPENSION ORAL 4 TIMES DAILY
Qty: 560 ML | Refills: 0 | Status: SHIPPED | OUTPATIENT
Start: 2023-01-10 | End: 2023-01-24

## 2023-01-10 RX ADMIN — FAMOTIDINE 20 MG: 20 TABLET, FILM COATED ORAL at 06:33

## 2023-01-10 RX ADMIN — LIDOCAINE HYDROCHLORIDE 40 ML: 20 SOLUTION ORAL; TOPICAL at 07:05

## 2023-01-10 RX ADMIN — IOPAMIDOL 100 ML: 755 INJECTION, SOLUTION INTRAVENOUS at 06:44

## 2023-01-10 NOTE — ED PROVIDER NOTES
[facility] EMERGENCY DEPT  EMERGENCY DEPARTMENT ENCOUNTER       Pt Name: Nazario Valentine  MRN: [id number]  [place] 1950  Date of evaluation: 1/10/2023  Provider: Alicia Crump MD   PCP: Luz Marina Dinero MD  Note Started: 6:31 AM 1/10/23     CHIEF COMPLAINT       Chief Complaint   Patient presents with    Chest Pain        HISTORY OF PRESENT ILLNESS: 1 or more elements      History From: Patien, History limited by: None     Nazario Valentine is a 67 y.o. female who presents with abrupt onset chest pain. Patient reports she had severe left-sided chest pain, substernal chest pain which woke her from sleep this morning. Associated this she had a tingling in all fingers and toes. Pain radiated to her left side, was abrupt in onset. Prior to this she did have some left upper quadrant abdominal pain. The pain is worse with deep breathing. No dyspnea no nausea or vomiting. She reports ongoing pressure in her chest which is mild in severity. Pain was quite severe 10 out of 10 when she awoke from sleep. Nursing Notes were all reviewed and agreed with or any disagreements were addressed in the HPI. REVIEW OF SYSTEMS        Positives and Pertinent negatives as per HPI.     PAST HISTORY     Past Medical History:  Past Medical History:   Diagnosis Date    Arthritis     Asthma     GERD (gastroesophageal reflux disease)     Ill-defined condition     chronic migraines    Nausea & vomiting     Thyroid disease      Past Surgical History:  Past Surgical History:   Procedure Laterality Date    COLONOSCOPY N/A 11/20/2020    COLONOSCOPY performed by Willis Fabian MD at [facility] ENDOSCOPY    HX GYN      hysterectomy    HX HEENT      septal tubinplasty    HX OTHER SURGICAL      piliondial cyst       Family History:  Family History   Problem Relation Age of Onset    Cancer Mother     Heart Disease Mother        Social History:  Social History     Tobacco Use    Smoking status: Never    Smokeless tobacco: Never   Substance Use Topics    Alcohol use: Yes     Comment: socially    Drug use: Never       Allergies: Allergies   Allergen Reactions    Codeine Nausea and Vomiting    Penicillins Hives    Saline Spray [Sodium Chloride] Other (comments)     Causes sneezing    Tetracyclines Hives       CURRENT MEDICATIONS      Discharge Medication List as of 1/10/2023  7:01 AM        CONTINUE these medications which have NOT CHANGED    Details   pantoprazole (PROTONIX) 20 mg tablet Take 20 mg by mouth daily. , Historical Med      fluticasone propionate (Flovent Diskus) 100 mcg/actuation dsdv Take  by inhalation two (2) times a day., Historical Med      OTHER 1 Tab daily. vitafusion, Historical Med      co-enzyme Q-10 (CO Q-10) 100 mg capsule Take 100 mg by mouth daily. , Historical Med      fremanezumab-vfrm (Ajovy Syringe) 225 mg/1.5 mL syrg by SubCUTAneous route., Historical Med      estradioL 0.025 mg/24 hr ptsw by TransDERmal route., Historical Med      estradioL (VIVELLE) 0.1 mg/24 hr 1 Patch by TransDERmal route two (2) days a week., Historical Med      empagliflozin/metformin HCl (SYNJARDY PO) Take  by mouth., Historical Med      ondansetron (ZOFRAN ODT) 4 mg disintegrating tablet Take 1 Tab by mouth every eight (8) hours as needed for Nausea for up to 10 doses. , Print, Disp-10 Tab, R-0      mometasone (NASONEX) 50 mcg/actuation nasal spray 2 Sprays daily. , Historical Med      levothyroxine (SYNTHROID) 88 mcg tablet Take 75 mcg by mouth Daily (before breakfast). , Historical Med      rosuvastatin (CRESTOR) 20 mg tablet Take 20 mg by mouth nightly., Historical Med      metFORMIN (GLUCOPHAGE) 500 mg tablet Take 500 mg by mouth daily (with breakfast). , Historical Med      dulaglutide (TRULICITY) 1.5 EI/4.7 mL sub-q pen 1.5 mg by SubCUTAneous route every ten (10) days. , Historical Med      diclofenac (VOLTAREN) 1 % gel Apply  to affected area four (4) times daily. , Historical Med      dicyclomine (BENTYL) 10 mg capsule Take 10 mg by mouth nightly as needed., Historical Med      ergocalciferol (VITAMIN D2) 50,000 unit capsule Take 50,000 Units by mouth every ten (10) days. , Historical Med      cyanocobalamin (VITAMIN B-12) 1,000 mcg/mL injection 1,000 mcg by IntraMUSCular route every seven (7) days. , Historical Med      Azelastine (ASTEPRO) 0.15 % (205.5 mcg) nasal spray two (2) times a day., Historical Med             SCREENINGS               No data recorded         PHYSICAL EXAM      ED Triage Vitals [01/10/23 0246]   ED Encounter Vitals Group      /69      Pulse (Heart Rate) 82      Resp Rate 16      Temp 97.9 °F (36.6 °C)      Temp src       O2 Sat (%) 98 %      Weight 129 lb 8 oz      Height 5' 3\"        Physical Exam  Vitals and nursing note reviewed. Constitutional:       General: She is not in acute distress. Appearance: She is well-developed. She is not ill-appearing. Cardiovascular:      Pulses:           Radial pulses are 2+ on the right side and 2+ on the left side. Pulmonary:      Effort: No tachypnea. Musculoskeletal:      Comments: Negative Spurling sign bilaterally. Neurological:      Mental Status: She is alert.       Comments: Alert and oriented, speech is fluent and comprehensive  CN: Extraocular movements normal, pupils equal round and reactive to light, face is symmetric, sensation intact to light touch bilaterally  Strength 5/5 bilateral upper and lower extremities  Sensation symmetric bilateral upper and lower extremities  No ataxia in gait, no nystagmus        DIAGNOSTIC RESULTS   LABS:     Recent Results (from the past 12 hour(s))   EKG, 12 LEAD, INITIAL    Collection Time: 01/10/23  2:41 AM   Result Value Ref Range    Ventricular Rate 83 BPM    Atrial Rate 83 BPM    P-R Interval 154 ms    QRS Duration 82 ms    Q-T Interval 384 ms    QTC Calculation (Bezet) 451 ms    Calculated P Axis 73 degrees    Calculated R Axis 61 degrees    Calculated T Axis 72 degrees    Diagnosis       Sinus rhythm with premature atrial complexes  Nonspecific ST abnormality  No previous ECGs available     CBC WITH AUTOMATED DIFF    Collection Time: 01/10/23  2:52 AM   Result Value Ref Range    WBC 7.0 3.6 - 11.0 K/uL    RBC 4.52 3.80 - 5.20 M/uL    HGB 13.0 11.5 - 16.0 g/dL    HCT 41.7 35.0 - 47.0 %    MCV 92.3 80.0 - 99.0 FL    MCH 28.8 26.0 - 34.0 PG    MCHC 31.2 30.0 - 36.5 g/dL    RDW 13.4 11.5 - 14.5 %    PLATELET 296 821 - 748 K/uL    MPV 8.9 8.9 - 12.9 FL    NRBC 0.0 0  WBC    ABSOLUTE NRBC 0.00 0.00 - 0.01 K/uL    NEUTROPHILS 61 32 - 75 %    LYMPHOCYTES 31 12 - 49 %    MONOCYTES 7 5 - 13 %    EOSINOPHILS 1 0 - 7 %    BASOPHILS 0 0 - 1 %    IMMATURE GRANULOCYTES 0 0.0 - 0.5 %    ABS. NEUTROPHILS 4.3 1.8 - 8.0 K/UL    ABS. LYMPHOCYTES 2.2 0.8 - 3.5 K/UL    ABS. MONOCYTES 0.5 0.0 - 1.0 K/UL    ABS. EOSINOPHILS 0.0 0.0 - 0.4 K/UL    ABS. BASOPHILS 0.0 0.0 - 0.1 K/UL    ABS. IMM. GRANS. 0.0 0.00 - 0.04 K/UL    DF AUTOMATED     METABOLIC PANEL, COMPREHENSIVE    Collection Time: 01/10/23  2:52 AM   Result Value Ref Range    Sodium 138 136 - 145 mmol/L    Potassium 4.2 3.5 - 5.1 mmol/L    Chloride 104 97 - 108 mmol/L    CO2 29 21 - 32 mmol/L    Anion gap 5 5 - 15 mmol/L    Glucose 92 65 - 100 mg/dL    BUN 18 6 - 20 MG/DL    Creatinine 0.98 0.55 - 1.02 MG/DL    BUN/Creatinine ratio 18 12 - 20      eGFR >60 >60 ml/min/1.73m2    Calcium 9.7 8.5 - 10.1 MG/DL    Bilirubin, total 0.5 0.2 - 1.0 MG/DL    ALT (SGPT) 23 12 - 78 U/L    AST (SGOT) 20 15 - 37 U/L    Alk.  phosphatase 53 45 - 117 U/L    Protein, total 7.2 6.4 - 8.2 g/dL    Albumin 4.0 3.5 - 5.0 g/dL    Globulin 3.2 2.0 - 4.0 g/dL    A-G Ratio 1.3 1.1 - 2.2     NT-PRO BNP    Collection Time: 01/10/23  2:52 AM   Result Value Ref Range    NT pro-BNP 79 <125 PG/ML   TROPONIN-HIGH SENSITIVITY    Collection Time: 01/10/23  2:52 AM   Result Value Ref Range    Troponin-High Sensitivity 5 0 - 51 ng/L   TROPONIN-HIGH SENSITIVITY    Collection Time: 01/10/23  5:27 AM   Result Value Ref Range Troponin-High Sensitivity 4 0 - 51 ng/L        EKG: If performed, independent interpretation documented below in the MDM section     RADIOLOGY:  Non-plain film images such as CT, Ultrasound and MRI are read by the radiologist. Plain radiographic images are visualized and preliminarily interpreted by the ED Provider with the findings documented in the MDM section. Interpretation per the Radiologist below, if available at the time of this note:     CTA CHEST W OR W WO CONT    Result Date: 1/10/2023  EXAM:  CTA CHEST W OR W WO CONT INDICATION: PE COMPARISON: None. TECHNIQUE: Helical thin section chest CT following uneventful intravenous administration of nonionic contrast 100 mL of isovue 370 according to departmental PE protocol. Coronal and sagittal reformats were performed. 3D post processing was performed. CT dose reduction was achieved through the use of a standardized protocol tailored for this examination and automatic exposure control for dose modulation. FINDINGS: This is a good quality study for the evaluation of pulmonary embolism to the first subsegmental arterial level. There is no pulmonary embolism to this level. THYROID: No nodule. MEDIASTINUM: No mass or lymphadenopathy. JEFF: No mass or lymphadenopathy. THORACIC AORTA: No aneurysm. HEART: Normal in size. ESOPHAGUS: No wall thickening or dilatation. TRACHEA/BRONCHI: Patent. PLEURA: No effusion or pneumothorax. LUNGS: No nodule, mass, or airspace disease. UPPER ABDOMEN: Partially imaged. No acute pathology. BONES: No aggressive bone lesion or fracture. No evidence of pulmonary embolus. No acute cardiopulmonary process. XR CHEST PORT    Result Date: 1/10/2023  EXAM:  XR CHEST PORT INDICATION: Chest pain COMPARISON: 7/27/2017 TECHNIQUE: Upright portable chest AP view FINDINGS: The cardiac silhouette is within normal limits. The pulmonary vasculature is within normal limits. The lungs and pleural spaces are clear.  The visualized bones and upper abdomen are age-appropriate. No acute cardiopulmonary process. PROCEDURES   Unless otherwise noted below, none  Procedures     CRITICAL CARE TIME       EMERGENCY DEPARTMENT COURSE and DIFFERENTIAL DIAGNOSIS/MDM   Vitals:    Vitals:    01/10/23 0246 01/10/23 0521   BP: 119/69 101/64   Pulse: 82 72   Resp: 16 14   Temp: 97.9 °F (36.6 °C)    SpO2: 98% 96%   Weight: 58.7 kg (129 lb 8 oz)    Height: 5' 3\" (1.6 m)         Patient was given the following medications:  Medications   famotidine (PEPCID) tablet 20 mg (20 mg Oral Given 1/10/23 1286)   maalox/viscous lidocaine (COV GI COCKTAIL) (40 mL Oral Given 1/10/23 0705)   iopamidoL (ISOVUE-370) 370 mg iodine /mL (76 %) injection 100 mL (100 mL IntraVENous Given 1/10/23 0644)       Medical Decision Making  DDx ACS, consider PE with pleuritic nature to pain, consider dissection also with abrupt onset of pain, not ripping or tearing, mild at time presentation blood pressure 101/64 lower my suspicion for dissection    She is not tachycardic or tachypneic O2 sats 96% on room air. Will obtain EKG troponin CBC CMP. Start with chest x-ray however consider CT to rule out the above. Amount and/or Complexity of Data Reviewed  Labs: ordered. Decision-making details documented in ED Course. Radiology: ordered and independent interpretation performed. Decision-making details documented in ED Course. ECG/medicine tests: ordered and independent interpretation performed. Decision-making details documented in ED Course. Risk  Prescription drug management. ED Course as of 01/10/23 0830   Tue Luciano 10, 2023   0607 Troponin 5 BNP 79 CBC normal counts CMP normal [WB]   0610 ECG obtained shows sinus rhythm rate of 83 with PACs, normal axis and normal intervals.   No STEMI no peak T waves, no ischemic changes read as nonspecific ST abnormality [WB]   0622 Initial troponin 5, repeat troponin is 4 [WB]   0657 CT shows no acute abnormality, or aorta aorta is normal pulmonary arteries are normal lung parenchyma are normal [WB]      ED Course User Index  [WB] Harshal Ng MD     FINAL IMPRESSION     1. Acute chest pain          DISPOSITION/PLAN   Stephanie Drake's  results have been reviewed with her. She has been counseled regarding her diagnosis, treatment, and plan. She verbally conveys understanding and agreement of the signs, symptoms, diagnosis, treatment and prognosis and additionally agrees to follow up as discussed. She also agrees with the care-plan and conveys that all of her questions have been answered. I have also provided discharge instructions for her that include: educational information regarding their diagnosis and treatment, and list of reasons why they would want to return to the ED prior to their follow-up appointment, should her condition change. CLINICAL IMPRESSION    Discharge Note: The patient is stable for discharge home. The signs, symptoms, diagnosis, and discharge instructions have been discussed, understanding conveyed, and agreed upon. The patient is to follow up as recommended or return to ER should their symptoms worsen. PATIENT REFERRED TO:  Follow-up Information       Follow up With Specialties Details Why Contact Info    Kishore Baeza MD Family Medicine  To discuss further outpatient testing [address] [place] [address] [phone]                DISCHARGE MEDICATIONS:  Discharge Medication List as of 1/10/2023  7:01 AM        START taking these medications    Details   sucralfate (CARAFATE) 100 mg/mL suspension Take 10 mL by mouth four (4) times daily for 14 days. , Normal, Disp-560 mL, R-0           CONTINUE these medications which have NOT CHANGED    Details   pantoprazole (PROTONIX) 20 mg tablet Take 20 mg by mouth daily. , Historical Med      fluticasone propionate (Flovent Diskus) 100 mcg/actuation dsdv Take  by inhalation two (2) times a day., Historical Med      OTHER 1 Tab daily. vitafusion, Historical Med      co-enzyme Q-10 (CO Q-10) 100 mg capsule Take 100 mg by mouth daily. , Historical Med      fremanezumab-vfrm (Ajovy Syringe) 225 mg/1.5 mL syrg by SubCUTAneous route., Historical Med      estradioL 0.025 mg/24 hr ptsw by TransDERmal route., Historical Med      estradioL (VIVELLE) 0.1 mg/24 hr 1 Patch by TransDERmal route two (2) days a week., Historical Med      empagliflozin/metformin HCl (SYNJARDY PO) Take  by mouth., Historical Med      ondansetron (ZOFRAN ODT) 4 mg disintegrating tablet Take 1 Tab by mouth every eight (8) hours as needed for Nausea for up to 10 doses. , Print, Disp-10 Tab, R-0      mometasone (NASONEX) 50 mcg/actuation nasal spray 2 Sprays daily. , Historical Med      levothyroxine (SYNTHROID) 88 mcg tablet Take 75 mcg by mouth Daily (before breakfast). , Historical Med      rosuvastatin (CRESTOR) 20 mg tablet Take 20 mg by mouth nightly., Historical Med      metFORMIN (GLUCOPHAGE) 500 mg tablet Take 500 mg by mouth daily (with breakfast). , Historical Med      dulaglutide (TRULICITY) 1.5 OV/3.2 mL sub-q pen 1.5 mg by SubCUTAneous route every ten (10) days. , Historical Med      diclofenac (VOLTAREN) 1 % gel Apply  to affected area four (4) times daily. , Historical Med      dicyclomine (BENTYL) 10 mg capsule Take 10 mg by mouth nightly as needed., Historical Med      ergocalciferol (VITAMIN D2) 50,000 unit capsule Take 50,000 Units by mouth every ten (10) days. , Historical Med      cyanocobalamin (VITAMIN B-12) 1,000 mcg/mL injection 1,000 mcg by IntraMUSCular route every seven (7) days. , Historical Med      Azelastine (ASTEPRO) 0.15 % (205.5 mcg) nasal spray two (2) times a day., Historical Med               DISCONTINUED MEDICATIONS:  Discharge Medication List as of 1/10/2023  7:01 AM          I am the Primary Clinician of Record. Keegan Gomez MD (electronically signed)    (Please note that parts of this dictation were completed with voice recognition software.  Quite often unanticipated grammatical, syntax, homophones, and other interpretive errors are inadvertently transcribed by the computer software. Please disregards these errors.  Please excuse any errors that have escaped final proofreading.)

## 2023-01-10 NOTE — ED NOTES
DC info reviewed with Patient and , all questions answered. Patient well-appearing at time of discharge, vital signs stable, no acute distress. Ambulatory out of ED at this time.

## 2023-01-10 NOTE — ED TRIAGE NOTES
Pt ambulatory to triage c/o substernal chest pain that woke her up in her sleep tonight, radiating to neck and left arm, left arm numbness, left fingers tingling. Hx of asthma, pre-diabetic. Denies shortness of breath. Pt had thyroid removed and takes medication to replace thyroid hormones. Pt states endocrinologist noticed \"something\" when listening to her heart recently. Pt trying to get PCP appt.

## 2023-01-11 LAB
ATRIAL RATE: 83 BPM
CALCULATED P AXIS, ECG09: 73 DEGREES
CALCULATED R AXIS, ECG10: 61 DEGREES
CALCULATED T AXIS, ECG11: 72 DEGREES
DIAGNOSIS, 93000: NORMAL
P-R INTERVAL, ECG05: 154 MS
Q-T INTERVAL, ECG07: 384 MS
QRS DURATION, ECG06: 82 MS
QTC CALCULATION (BEZET), ECG08: 451 MS
VENTRICULAR RATE, ECG03: 83 BPM

## 2023-10-02 ENCOUNTER — PROCEDURE VISIT (OUTPATIENT)
Age: 73
End: 2023-10-02
Payer: MEDICARE

## 2023-10-02 DIAGNOSIS — M79.662 PAIN IN BOTH LOWER LEGS: ICD-10-CM

## 2023-10-02 DIAGNOSIS — R20.2 NUMBNESS AND TINGLING OF BOTH LEGS: Primary | ICD-10-CM

## 2023-10-02 DIAGNOSIS — M79.661 PAIN IN BOTH LOWER LEGS: ICD-10-CM

## 2023-10-02 DIAGNOSIS — R20.0 NUMBNESS AND TINGLING OF BOTH LEGS: Primary | ICD-10-CM

## 2023-10-02 DIAGNOSIS — M79.2 NEURALGIA: ICD-10-CM

## 2023-10-02 PROCEDURE — 95886 MUSC TEST DONE W/N TEST COMP: CPT | Performed by: PSYCHIATRY & NEUROLOGY

## 2023-10-02 PROCEDURE — 95910 NRV CNDJ TEST 7-8 STUDIES: CPT | Performed by: PSYCHIATRY & NEUROLOGY

## 2023-11-08 ENCOUNTER — TELEPHONE (OUTPATIENT)
Age: 73
End: 2023-11-08

## 2023-11-27 ENCOUNTER — ANESTHESIA EVENT (OUTPATIENT)
Facility: HOSPITAL | Age: 73
End: 2023-11-27
Payer: MEDICARE

## 2023-11-27 RX ORDER — ESTRADIOL 1 MG/1
1 TABLET ORAL DAILY
COMMUNITY

## 2023-11-27 RX ORDER — LEVOTHYROXINE SODIUM 0.07 MG/1
75 TABLET ORAL DAILY
COMMUNITY

## 2023-11-27 RX ORDER — GABAPENTIN 600 MG/1
900 TABLET ORAL PRN
COMMUNITY

## 2023-11-27 RX ORDER — FLUTICASONE PROPIONATE AND SALMETEROL 100; 50 UG/1; UG/1
1 POWDER RESPIRATORY (INHALATION) 2 TIMES DAILY
COMMUNITY

## 2023-11-28 ENCOUNTER — HOSPITAL ENCOUNTER (OUTPATIENT)
Facility: HOSPITAL | Age: 73
Setting detail: OUTPATIENT SURGERY
Discharge: HOME | End: 2023-11-28
Attending: SPECIALIST | Admitting: SPECIALIST
Payer: MEDICARE

## 2023-11-28 ENCOUNTER — ANESTHESIA (OUTPATIENT)
Facility: HOSPITAL | Age: 73
End: 2023-11-28
Payer: MEDICARE

## 2023-11-28 VITALS
OXYGEN SATURATION: 96 % | HEIGHT: 63 IN | DIASTOLIC BLOOD PRESSURE: 51 MMHG | SYSTOLIC BLOOD PRESSURE: 115 MMHG | TEMPERATURE: 97.6 F | WEIGHT: 130 LBS | BODY MASS INDEX: 23.04 KG/M2 | HEART RATE: 77 BPM | RESPIRATION RATE: 17 BRPM

## 2023-11-28 PROCEDURE — 2580000003 HC RX 258: Performed by: SPECIALIST

## 2023-11-28 PROCEDURE — 3700000001 HC ADD 15 MINUTES (ANESTHESIA): Performed by: SPECIALIST

## 2023-11-28 PROCEDURE — 2500000003 HC RX 250 WO HCPCS: Performed by: NURSE ANESTHETIST, CERTIFIED REGISTERED

## 2023-11-28 PROCEDURE — 2709999900 HC NON-CHARGEABLE SUPPLY: Performed by: SPECIALIST

## 2023-11-28 PROCEDURE — 6360000002 HC RX W HCPCS: Performed by: NURSE ANESTHETIST, CERTIFIED REGISTERED

## 2023-11-28 PROCEDURE — 3700000000 HC ANESTHESIA ATTENDED CARE: Performed by: SPECIALIST

## 2023-11-28 PROCEDURE — 3600007502: Performed by: SPECIALIST

## 2023-11-28 PROCEDURE — 7100000010 HC PHASE II RECOVERY - FIRST 15 MIN: Performed by: SPECIALIST

## 2023-11-28 PROCEDURE — 3600007512: Performed by: SPECIALIST

## 2023-11-28 PROCEDURE — 88305 TISSUE EXAM BY PATHOLOGIST: CPT

## 2023-11-28 PROCEDURE — 7100000011 HC PHASE II RECOVERY - ADDTL 15 MIN: Performed by: SPECIALIST

## 2023-11-28 RX ORDER — SODIUM CHLORIDE 0.9 % (FLUSH) 0.9 %
5-40 SYRINGE (ML) INJECTION PRN
Status: DISCONTINUED | OUTPATIENT
Start: 2023-11-28 | End: 2023-11-28 | Stop reason: HOSPADM

## 2023-11-28 RX ORDER — SODIUM CHLORIDE 9 MG/ML
25 INJECTION, SOLUTION INTRAVENOUS PRN
Status: DISCONTINUED | OUTPATIENT
Start: 2023-11-28 | End: 2023-11-28 | Stop reason: HOSPADM

## 2023-11-28 RX ORDER — SODIUM CHLORIDE 0.9 % (FLUSH) 0.9 %
5-40 SYRINGE (ML) INJECTION EVERY 12 HOURS SCHEDULED
Status: DISCONTINUED | OUTPATIENT
Start: 2023-11-28 | End: 2023-11-28 | Stop reason: HOSPADM

## 2023-11-28 RX ADMIN — LIDOCAINE HYDROCHLORIDE 40 MG: 20 INJECTION, SOLUTION INFILTRATION; PERINEURAL at 07:58

## 2023-11-28 RX ADMIN — PROPOFOL 240 MG: 10 INJECTION, EMULSION INTRAVENOUS at 08:20

## 2023-11-28 RX ADMIN — SODIUM CHLORIDE: 9 INJECTION, SOLUTION INTRAVENOUS at 08:20

## 2023-11-28 RX ADMIN — SODIUM CHLORIDE 25 ML: 9 INJECTION, SOLUTION INTRAVENOUS at 07:19

## 2023-11-28 ASSESSMENT — PAIN - FUNCTIONAL ASSESSMENT
PAIN_FUNCTIONAL_ASSESSMENT: 0-10
PAIN_FUNCTIONAL_ASSESSMENT: 0-10

## 2023-11-28 NOTE — DISCHARGE INSTRUCTIONS
Augie Mike  [id number]  1950    COLON DISCHARGE INSTRUCTIONS  Discomfort:  Redness at IV site- apply warm compress to area; if redness or soreness persist- contact your physician  There may be a slight amount of blood passed from the rectum  Gaseous discomfort- walking, belching will help relieve any discomfort  You may not operate a vehicle for 12 hours  You may not engage in an occupation involving machinery or appliances for rest of today  You may not drink alcoholic beverages for at least 12 hours  Avoid making any critical decisions for at least 24 hour  DIET:   Regular diet. - however -  remember your colon is empty and a heavy meal will produce gas. Avoid these foods:  vegetables, fried / greasy foods, carbonated drinks for today. MEDICATIONS:        Regarding Aspirin or Nonsteroidal medications, please see below. ACTIVITY:  You may resume your normal daily activities it is recommended that you spend the remainder of the day resting -  avoid any strenuous activity. CALL M.D. ANY SIGN OF:  Increasing pain, nausea, vomiting  Abdominal distension (swelling)  New increased bleeding (oral or rectal)  Fever (chills)  Pain in chest area  Bloody discharge from nose or mouth  Shortness of breath  Tylenol as needed for pain.       Follow-up Instructions:   Call Dr. Archie Singer for results of procedure / biopsy in 4-5 days at telephone #  [phone]               Repeat Colonoscopy in 3 years    Findings:  Colon Polyp: removed  Diverticulosis  Internal hemorrhoids      Patient Education on Sedation / Analgesia Administered for Procedure      For 24 hours after general anesthesia or intravenous analgesia / sedation:  Have someone responsible help you with your care  Limit your activities  Do not drive and operate hazardous machinery  Do not make important personal, legal or business decisions  Do not drink alcoholic beverages  If you have not urinated within 8 hours after discharge, please contact your

## 2023-11-28 NOTE — PROCEDURES
Endoscopy Case End Note:    8284:  Procedure scope was pre-cleaned, per protocol, at bedside by A Althizer. 4367:  Report received from anesthesia Adron Number, CRNA. See anesthesia flowsheet for intra-procedure vital signs and events.

## 2023-11-28 NOTE — ANESTHESIA POSTPROCEDURE EVALUATION
Department of Anesthesiology  Postprocedure Note    Patient: David Campo  MRN: [id number]  YOB: 1950  Date of evaluation: 11/28/2023      Procedure Summary       Date: 11/28/23 Room / Location: [facility] ENDO 02 / [facility] ENDOSCOPY    Anesthesia Start: 0758 Anesthesia Stop: Rita Rebel    Procedure: COLONOSCOPY (Lower GI Region) Diagnosis:       Family history of malignant neoplasm of gastrointestinal tract      (Family history of malignant neoplasm of gastrointestinal tract [Z80.0])    Surgeons: Farheen Chin MD Responsible Provider: Elena Garcia MD    Anesthesia Type: MAC ASA Status: 2            Anesthesia Type: MAC    Gutierrez Phase I: Gutierrez Score: 10    Gutierrez Phase II:        Anesthesia Post Evaluation    Patient location during evaluation: PACU  Patient participation: complete - patient cannot participate  Level of consciousness: awake  Pain score: 0  Airway patency: patent  Nausea & Vomiting: no nausea and no vomiting  Complications: no  Cardiovascular status: hemodynamically stable  Respiratory status: acceptable  Hydration status: stable

## 2023-11-28 NOTE — PROGRESS NOTES
ARRIVAL INFORMATION:  Verified patient name and date of birth, scheduled procedure, and informed consent. : Patrick Meek ()contact number: [phone]  Physician and staff can share information with the . Belongings with patient include:  Clothing,None    GI FOCUSED ASSESSMENT:  Neuro: Awake, alert, oriented x4  Respiratory: even and unlabored   GI: soft and non-distended  EKG Rhythm: normal sinus rhythm    Education:Reviewed general discharge instructions and  information.

## 2023-11-28 NOTE — OP NOTE
Colonoscopy Procedure Note    Indications:   Personal history of colon polyps (screening only)    Referring Physician: Luis Escalante MD  Anesthesia/Sedation: MAC anesthesia Propofol  Endoscopist:  Dr. Janett Troy    Procedure in Detail:  Informed consent was obtained for the procedure, including sedation. Risks of perforation, hemorrhage, adverse drug reaction, and aspiration were discussed. The patient was placed in the left lateral decubitus position. Based on the pre-procedure assessment, including review of the patient's medical history, medications, allergies, and review of systems, she had been deemed to be an appropriate candidate for moderate sedation; she was therefore sedated with the medications listed above. The patient was monitored continuously with ECG tracing, pulse oximetry, blood pressure monitoring, and direct observations. A rectal examination was performed. The [id number] was inserted into the rectum and advanced under direct vision to the cecum, which was identified by the ileocecal valve and appendiceal orifice. The quality of the colonic preparation was adequate. A careful inspection was made as the colonoscope was withdrawn, including a retroflexed view of the rectum; findings and interventions are described below. Appropriate photodocumentation was obtained. Findings:    Scope advanced to the cecum. Preparation was adequate. (1) sessile polyp 5 mm in the ascending colon s/p cold snare removal.   Diffuse diverticulosis throughout the sigmoid and descending colon. Internal hemorrhoids. Therapies:  see above    Specimen: Specimens were collected as described above and sent to pathology. Complications: None were encountered during the procedure. EBL: < 10 ml. Recommendations:     - Repeat colonoscopy in 3 years.     Signed By: Janett Troy MD                        November 28, 2023

## 2024-09-30 ENCOUNTER — TELEPHONE (OUTPATIENT)
Age: 74
End: 2024-09-30

## 2024-09-30 ENCOUNTER — OFFICE VISIT (OUTPATIENT)
Age: 74
End: 2024-09-30
Payer: MEDICARE

## 2024-09-30 VITALS
BODY MASS INDEX: 23.04 KG/M2 | RESPIRATION RATE: 14 BRPM | WEIGHT: 130 LBS | OXYGEN SATURATION: 98 % | HEART RATE: 88 BPM | SYSTOLIC BLOOD PRESSURE: 100 MMHG | HEIGHT: 63 IN | DIASTOLIC BLOOD PRESSURE: 64 MMHG

## 2024-09-30 DIAGNOSIS — G62.9 SMALL FIBER NEUROPATHY: ICD-10-CM

## 2024-09-30 DIAGNOSIS — M79.2 NEURALGIA: ICD-10-CM

## 2024-09-30 DIAGNOSIS — M79.662 PAIN IN BOTH LOWER LEGS: ICD-10-CM

## 2024-09-30 DIAGNOSIS — M79.661 PAIN IN BOTH LOWER LEGS: ICD-10-CM

## 2024-09-30 DIAGNOSIS — R20.2 NUMBNESS AND TINGLING OF BOTH LEGS: Primary | ICD-10-CM

## 2024-09-30 DIAGNOSIS — R20.0 NUMBNESS AND TINGLING OF BOTH LEGS: Primary | ICD-10-CM

## 2024-09-30 PROCEDURE — 1036F TOBACCO NON-USER: CPT | Performed by: PSYCHIATRY & NEUROLOGY

## 2024-09-30 PROCEDURE — G8400 PT W/DXA NO RESULTS DOC: HCPCS | Performed by: PSYCHIATRY & NEUROLOGY

## 2024-09-30 PROCEDURE — 1090F PRES/ABSN URINE INCON ASSESS: CPT | Performed by: PSYCHIATRY & NEUROLOGY

## 2024-09-30 PROCEDURE — 3017F COLORECTAL CA SCREEN DOC REV: CPT | Performed by: PSYCHIATRY & NEUROLOGY

## 2024-09-30 PROCEDURE — 99215 OFFICE O/P EST HI 40 MIN: CPT | Performed by: PSYCHIATRY & NEUROLOGY

## 2024-09-30 PROCEDURE — G8420 CALC BMI NORM PARAMETERS: HCPCS | Performed by: PSYCHIATRY & NEUROLOGY

## 2024-09-30 PROCEDURE — G8427 DOCREV CUR MEDS BY ELIG CLIN: HCPCS | Performed by: PSYCHIATRY & NEUROLOGY

## 2024-09-30 PROCEDURE — 1123F ACP DISCUSS/DSCN MKR DOCD: CPT | Performed by: PSYCHIATRY & NEUROLOGY

## 2024-09-30 RX ORDER — ELETRIPTAN HYDROBROMIDE 40 MG/1
40 TABLET, FILM COATED ORAL PRN
COMMUNITY
Start: 2020-11-11

## 2024-09-30 RX ORDER — ALBUTEROL SULFATE 90 UG/1
1 INHALANT RESPIRATORY (INHALATION) EVERY 6 HOURS PRN
COMMUNITY

## 2024-09-30 RX ORDER — DULOXETIN HYDROCHLORIDE 20 MG/1
20 CAPSULE, DELAYED RELEASE ORAL
COMMUNITY
Start: 2024-04-26

## 2024-09-30 RX ORDER — DULAGLUTIDE 0.75 MG/.5ML
0.75 INJECTION, SOLUTION SUBCUTANEOUS
COMMUNITY
Start: 2024-03-18

## 2024-09-30 ASSESSMENT — PATIENT HEALTH QUESTIONNAIRE - PHQ9
SUM OF ALL RESPONSES TO PHQ QUESTIONS 1-9: 0
2. FEELING DOWN, DEPRESSED OR HOPELESS: NOT AT ALL
SUM OF ALL RESPONSES TO PHQ9 QUESTIONS 1 & 2: 0
SUM OF ALL RESPONSES TO PHQ QUESTIONS 1-9: 0
1. LITTLE INTEREST OR PLEASURE IN DOING THINGS: NOT AT ALL
SUM OF ALL RESPONSES TO PHQ QUESTIONS 1-9: 0
SUM OF ALL RESPONSES TO PHQ QUESTIONS 1-9: 0

## 2024-09-30 NOTE — TELEPHONE ENCOUNTER
Called PCP office and left voice message asking for most recent labs faxed over to us. Advised to call if needed.

## 2024-09-30 NOTE — PROGRESS NOTES
1. Have you been to the ER, urgent care clinic since your last visit?  Hospitalized since your last visit?  No.    2. Have you seen or consulted any other health care providers outside of the [facility] since your last visit?  Include any pap smears or colon screening.   No.      Chief Complaint   Patient presents with    New Patient     Had EMG last yr- pain in legs/ burning in legs - seen by  at [facility] for pain management       
negative  Genitourinary: negative  Musculoskeletal: negative  Skin and integumentary: negative  Psychiatric: negative  Endocrine: negative  Neurological: negative, except for HPI  Hematologic/lymphatic: negative  Allergy/immunology: negative    Objective:     /64 (Site: Left Upper Arm, Position: Sitting, Cuff Size: Medium Adult)   Pulse 88   Resp 14   Ht 1.6 m (5' 3\")   Wt 59 kg (130 lb)   SpO2 98%   BMI 23.03 kg/m²     Physical Exam:      General:  appears well nourished in no acute distress  Neck: no carotid bruits  Lungs: clear to auscultation  Heart:  no murmurs, regular rate  Lower extremity: peripheral pulses palpable and no edema  Skin: intact    Neurological exam:    Awake, alert, oriented to person, place and time  Recent and remote memory were normal  Attention and concentration were intact  Language was intact.  There was no aphasia  Speech: no dysarthria  Fund of knowledge was preserved    Cranial nerves:   II-XII were tested    Perrrla  Fundoscopic examination revealed venous pulsations and no clear abnormalities  Visual fields were full  Eomi, no evidence of nystagmus  Facial sensation:  normal and symmetric  Facial motor: normal and symmetric  Hearing intact  SCM strength intact  Tongue: midline without fasciculations    Motor:   Tone normal    No evidence of fasciculations    Strength testing:   deltoid triceps biceps Wrist ext. Wrist flex. intrinsics Hip flex. Hip ext. Knee ext.  Knee flex Dorsi flex Plantar flex   Right 5 5 5 5 5 5 5 5 5 5 5 5   Left 5 5 5 5 5 5 5 5 5 5 5 5         Sensory:  Upper extremity: intact to pp, light touch, and vibration > 10 seconds  Lower extremity: intact to pp, light touch, and vibration > 10 seconds  She has rather significant allodynia in her lower extremities up to the level of the knees.  She is able to feel the pinprick but at certain spots it is very sensitive to her.  She also does notices a slight bit in her

## 2024-10-07 ENCOUNTER — CLINICAL DOCUMENTATION (OUTPATIENT)
Age: 74
End: 2024-10-07

## 2024-11-12 NOTE — PROGRESS NOTES
Neurology Note    Patient ID:  Neida Reeves  [id number]  74 y.o.  1950      Date of Consultation:  November 13, 2024      Assessment and Plan:    The patient is a pleasant individual referred for a skin biopsy for possible small fiber neuropathy    PERFORMING PHYSICIAN: Gavin Rdz DO FAAN  PROCEDURE:  Skin Punch Biopsy      ICD-10 Code: Polyneuropathy, unspecified  G62.9     Medications/ Supplies:   Skin-punch biopsy kit from [employer] including: skin punch biopsy tool, povidone/ iodine prep pad, alcohol prep pad   1% Lidocaine used for anesthesia.   5 mL syringe, 30G 1/2\" needle    The procedure and potential complications were explained to the patient, and verbal consent was obtained.    A timeout was performed    The skin was cleansed with iodine over the:    - Right Calf (10 centimeters proximal to the lat malleolus)  - Right proximal thigh    A total of 1.5 cc of 1% lidocaine was used at each biopsy site.    A skin punch biopsy was performed at the distal site with the provided biopsy tool, and the biopsy was placed into container labeled distal sample.    The process was repeated at the proximal sites and the biopsies were placed into the appropriate containers.   The containers were labeled with the patient names, lids tightened and placed on ice in the supplied styrofoam box and sent by [employer] to the lab.    The biopsy sites were cleaned, pressure held, and then bandages applied.    Blood loss was minimal.  The patient tolerated the procedure well.  There were no immediate or obvious complications.  The patient will be contacted about biopsy results.        ___________________  Gavin Rdz DO FAAN               Subjective: I am here for a skin biopsy       History of Present Illness:   Neida Reeves is a 74 y.o. female referred for a skin biopsy for possible small fiber neuropathy        Past Medical History:   Diagnosis Date    Arthritis     Asthma     GERD (gastroesophageal reflux disease)

## 2024-11-13 ENCOUNTER — TELEPHONE (OUTPATIENT)
Age: 74
End: 2024-11-13

## 2024-11-13 ENCOUNTER — PROCEDURE VISIT (OUTPATIENT)
Age: 74
End: 2024-11-13
Payer: MEDICARE

## 2024-11-13 DIAGNOSIS — G62.9 SMALL FIBER NEUROPATHY: Primary | ICD-10-CM

## 2024-11-13 PROCEDURE — 11104 PUNCH BX SKIN SINGLE LESION: CPT | Performed by: PSYCHIATRY & NEUROLOGY

## 2024-11-13 PROCEDURE — 11105 PUNCH BX SKIN EA SEP/ADDL: CPT | Performed by: PSYCHIATRY & NEUROLOGY

## 2024-11-13 NOTE — TELEPHONE ENCOUNTER
Pt had a skin biopsy done in the office and stopped by the desk after the appt, she wanted to know if she needed to schedule a follow up.

## 2024-11-14 NOTE — TELEPHONE ENCOUNTER
Called patient and verified with 2 ID's. Informed her that at this time there is no need to schedule a follow up. Informed patient that we will contact her with the results of her biopsy in up to 7 weeks and depending on those results we will reassess if a follow up is needed. She verbalized understanding and had no other questions at this time.

## 2024-12-03 ENCOUNTER — TELEPHONE (OUTPATIENT)
Age: 74
End: 2024-12-03

## 2024-12-03 NOTE — TELEPHONE ENCOUNTER
Called pt unable to leave message due to mail box is full.     Sent pt a my chart message regarding the results.      Placed skin biopsy results in fast scan.

## 2024-12-03 NOTE — TELEPHONE ENCOUNTER
Returned call,verified pt with two pt identifiers, relayed this message to pt:      I tried to call you but there was no answer and your voice mail box is full. We received the results from your skin biopsy back.  reviewed and advised that the biopsy was positive for small fiber neuropathy. That is where the small fibers in skin and body are damaged. It can cause some of the burning, tingling feelings.  He advised your thyroid disease is contributing to this. He can discuss it further with you at a follow up. We have some availability on 12/23/24 in the afternoon at 2 or 2:30 if you would like to come in and discuss.      Thanks,  Jyoti EMERY       Scheduled appt 12/23/24 at 2:30 with  to discuss her skin biopsy results. Pt verbalized understanding and thanked me for the call.

## 2024-12-18 NOTE — PROGRESS NOTES
Neurology Note    Patient ID:  Neida Reeves  [id number]  74 y.o.  1950      Date of Consultation:  December 23, 2024      Assessment and Plan:    The patient is a pleasant  female with a  2+ year history of sensory disturbance, tingling and allodynia predominantly in her bilateral lower extremities and to a lesser extent in her hands.  Her examination does reveal full strength and preserved reflexes.      Diffuse sensory disturbance including tingling and allodynia:  Her examination revealed full strength and preserved reflexes  EMG/nerve conduction study performed in October 2023 was normal  Skin biopsy suggestive of a small fiber neuropathy  Small fiber neuropathies were discussed at length today.  Patient's known medical conditions can be associated with a small fiber neuropathy include thyroid disease and elevated cholesterol  Serological testing that will need to be verified include gliadin antibodies, vitamin B6, SSA, SSB, serum immunofixation  She is scheduled to have her lipid panel rechecked.  Reportedly her last one was abnormal, which was felt to be error  We discussed the importance of exercise and activity.       Neuropathic pain:  Patient will continue to take Cymbalta 90 and gabapentin 600 mg three times a day which continue to help her symptoms    Chronic migraine headaches:  Follows at [facility] neurology and receives botulinum toxin injections           Subjective: numbness and tingling       History of Present Illness:   Neida Reeves is a 74 y.o. female who returns to the neurology clinic at [facility] for evaluation.  The patient has been followed at the [facility] neurology clinic for chronic migraines.  She was referred for  persistent tingling and numbness in her lower extremities since 2022.  I first saw the patient on 9/30/2024.  Please see my history of present illness, examination, and treatment plan from that day.  Since she had a normal emg/ncs in the past, she was

## 2024-12-23 ENCOUNTER — OFFICE VISIT (OUTPATIENT)
Age: 74
End: 2024-12-23
Payer: MEDICARE

## 2024-12-23 VITALS
HEART RATE: 94 BPM | DIASTOLIC BLOOD PRESSURE: 76 MMHG | BODY MASS INDEX: 22.68 KG/M2 | SYSTOLIC BLOOD PRESSURE: 102 MMHG | WEIGHT: 128 LBS | OXYGEN SATURATION: 99 % | HEIGHT: 63 IN | RESPIRATION RATE: 16 BRPM

## 2024-12-23 DIAGNOSIS — G62.9 SMALL FIBER NEUROPATHY: ICD-10-CM

## 2024-12-23 DIAGNOSIS — M79.2 NEUROPATHIC PAIN: ICD-10-CM

## 2024-12-23 DIAGNOSIS — R20.0 NUMBNESS AND TINGLING OF BOTH LEGS: ICD-10-CM

## 2024-12-23 DIAGNOSIS — R20.2 NUMBNESS AND TINGLING OF BOTH LEGS: ICD-10-CM

## 2024-12-23 DIAGNOSIS — M79.2 NEURALGIA: ICD-10-CM

## 2024-12-23 DIAGNOSIS — G62.9 SMALL FIBER NEUROPATHY: Primary | ICD-10-CM

## 2024-12-23 PROCEDURE — 1123F ACP DISCUSS/DSCN MKR DOCD: CPT | Performed by: PSYCHIATRY & NEUROLOGY

## 2024-12-23 PROCEDURE — 1090F PRES/ABSN URINE INCON ASSESS: CPT | Performed by: PSYCHIATRY & NEUROLOGY

## 2024-12-23 PROCEDURE — 1036F TOBACCO NON-USER: CPT | Performed by: PSYCHIATRY & NEUROLOGY

## 2024-12-23 PROCEDURE — 3017F COLORECTAL CA SCREEN DOC REV: CPT | Performed by: PSYCHIATRY & NEUROLOGY

## 2024-12-23 PROCEDURE — G8484 FLU IMMUNIZE NO ADMIN: HCPCS | Performed by: PSYCHIATRY & NEUROLOGY

## 2024-12-23 PROCEDURE — 99214 OFFICE O/P EST MOD 30 MIN: CPT | Performed by: PSYCHIATRY & NEUROLOGY

## 2024-12-23 PROCEDURE — 1126F AMNT PAIN NOTED NONE PRSNT: CPT | Performed by: PSYCHIATRY & NEUROLOGY

## 2024-12-23 PROCEDURE — 1159F MED LIST DOCD IN RCRD: CPT | Performed by: PSYCHIATRY & NEUROLOGY

## 2024-12-23 PROCEDURE — G8420 CALC BMI NORM PARAMETERS: HCPCS | Performed by: PSYCHIATRY & NEUROLOGY

## 2024-12-23 PROCEDURE — G8427 DOCREV CUR MEDS BY ELIG CLIN: HCPCS | Performed by: PSYCHIATRY & NEUROLOGY

## 2024-12-23 PROCEDURE — 1160F RVW MEDS BY RX/DR IN RCRD: CPT | Performed by: PSYCHIATRY & NEUROLOGY

## 2024-12-23 PROCEDURE — G8400 PT W/DXA NO RESULTS DOC: HCPCS | Performed by: PSYCHIATRY & NEUROLOGY

## 2024-12-23 ASSESSMENT — PATIENT HEALTH QUESTIONNAIRE - PHQ9
SUM OF ALL RESPONSES TO PHQ QUESTIONS 1-9: 0
2. FEELING DOWN, DEPRESSED OR HOPELESS: NOT AT ALL
SUM OF ALL RESPONSES TO PHQ QUESTIONS 1-9: 0
SUM OF ALL RESPONSES TO PHQ9 QUESTIONS 1 & 2: 0
1. LITTLE INTEREST OR PLEASURE IN DOING THINGS: NOT AT ALL
SUM OF ALL RESPONSES TO PHQ QUESTIONS 1-9: 0
SUM OF ALL RESPONSES TO PHQ QUESTIONS 1-9: 0

## 2024-12-23 NOTE — PROGRESS NOTES
1. Have you been to the ER, urgent care clinic since your last visit?  Hospitalized since your last visit?  No.    2. Have you seen or consulted any other health care providers outside of the [facility] since your last visit?  Include any pap smears or colon screening.   No.      Chief Complaint   Patient presents with    Discuss Skin Biopsy results

## 2024-12-26 LAB
CENTROMERE B AB SER-ACNC: <0.2 AI (ref 0–0.9)
CHROMATIN AB SERPL-ACNC: <0.2 AI (ref 0–0.9)
DSDNA AB SER-ACNC: <1 IU/ML (ref 0–9)
ENA JO1 AB SER-ACNC: <0.2 AI (ref 0–0.9)
ENA RNP AB SER-ACNC: <0.2 AI (ref 0–0.9)
ENA SCL70 AB SER-ACNC: <0.2 AI (ref 0–0.9)
ENA SM AB SER-ACNC: <0.2 AI (ref 0–0.9)
ENA SM+RNP AB SER-ACNC: <0.2 AI (ref 0–0.9)
ENA SS-A AB SER-ACNC: <0.2 AI (ref 0–0.9)
ENA SS-B AB SER-ACNC: <0.2 AI (ref 0–0.9)
GLIADIN PEPTIDE IGA SER-ACNC: 3 UNITS (ref 0–19)
GLIADIN PEPTIDE IGG SER-ACNC: 2 UNITS (ref 0–19)
IGA SERPL-MCNC: 256 MG/DL (ref 64–422)
IGG SERPL-MCNC: 620 MG/DL (ref 586–1602)
IGM SERPL-MCNC: 67 MG/DL (ref 26–217)
RIBOSOMAL P AB SER-ACNC: <0.2 AI (ref 0–0.9)
SEE BELOW:, 164879: NORMAL

## 2024-12-29 LAB — VIT B6 SERPL-MCNC: 57.2 UG/L (ref 3.4–65.2)

## 2025-01-06 LAB
IGA SERPL-MCNC: 256 MG/DL (ref 64–422)
IGG SERPL-MCNC: 620 MG/DL (ref 586–1602)
IGM SERPL-MCNC: 67 MG/DL (ref 26–217)
PROT PATTERN SERPL IFE-IMP: NORMAL

## 2025-06-17 ENCOUNTER — OFFICE VISIT (OUTPATIENT)
Age: 75
End: 2025-06-17
Payer: MEDICARE

## 2025-06-17 VITALS
DIASTOLIC BLOOD PRESSURE: 70 MMHG | WEIGHT: 137 LBS | HEART RATE: 66 BPM | OXYGEN SATURATION: 96 % | RESPIRATION RATE: 16 BRPM | HEIGHT: 63 IN | BODY MASS INDEX: 24.27 KG/M2 | TEMPERATURE: 96.9 F | SYSTOLIC BLOOD PRESSURE: 116 MMHG

## 2025-06-17 DIAGNOSIS — G43.119 INTRACTABLE MIGRAINE WITH AURA WITHOUT STATUS MIGRAINOSUS: ICD-10-CM

## 2025-06-17 DIAGNOSIS — R25.1 TREMOR: ICD-10-CM

## 2025-06-17 DIAGNOSIS — G62.9 SMALL FIBER NEUROPATHY: Primary | ICD-10-CM

## 2025-06-17 DIAGNOSIS — M79.2 NEUROPATHIC PAIN: ICD-10-CM

## 2025-06-17 PROCEDURE — 1090F PRES/ABSN URINE INCON ASSESS: CPT | Performed by: NURSE PRACTITIONER

## 2025-06-17 PROCEDURE — 99214 OFFICE O/P EST MOD 30 MIN: CPT | Performed by: NURSE PRACTITIONER

## 2025-06-17 PROCEDURE — G8427 DOCREV CUR MEDS BY ELIG CLIN: HCPCS | Performed by: NURSE PRACTITIONER

## 2025-06-17 PROCEDURE — 3017F COLORECTAL CA SCREEN DOC REV: CPT | Performed by: NURSE PRACTITIONER

## 2025-06-17 PROCEDURE — 1125F AMNT PAIN NOTED PAIN PRSNT: CPT | Performed by: NURSE PRACTITIONER

## 2025-06-17 PROCEDURE — 1160F RVW MEDS BY RX/DR IN RCRD: CPT | Performed by: NURSE PRACTITIONER

## 2025-06-17 PROCEDURE — 1123F ACP DISCUSS/DSCN MKR DOCD: CPT | Performed by: NURSE PRACTITIONER

## 2025-06-17 PROCEDURE — G8400 PT W/DXA NO RESULTS DOC: HCPCS | Performed by: NURSE PRACTITIONER

## 2025-06-17 PROCEDURE — G8420 CALC BMI NORM PARAMETERS: HCPCS | Performed by: NURSE PRACTITIONER

## 2025-06-17 PROCEDURE — 1159F MED LIST DOCD IN RCRD: CPT | Performed by: NURSE PRACTITIONER

## 2025-06-17 PROCEDURE — 1036F TOBACCO NON-USER: CPT | Performed by: NURSE PRACTITIONER

## 2025-06-17 ASSESSMENT — ENCOUNTER SYMPTOMS
PHOTOPHOBIA: 1
NAUSEA: 1

## 2025-06-17 ASSESSMENT — PATIENT HEALTH QUESTIONNAIRE - PHQ9
SUM OF ALL RESPONSES TO PHQ QUESTIONS 1-9: 1
1. LITTLE INTEREST OR PLEASURE IN DOING THINGS: NOT AT ALL
SUM OF ALL RESPONSES TO PHQ QUESTIONS 1-9: 1
2. FEELING DOWN, DEPRESSED OR HOPELESS: SEVERAL DAYS

## 2025-06-17 NOTE — PROGRESS NOTES
Identified pt with two pt identifiers(name and ). Reviewed record in preparation for visit and have obtained necessary documentation. All patient medications has been reviewed.  Chief Complaint   Patient presents with    Follow-up            No data to display                   No data to display                Health Maintenance Due   Topic    Lipids     Hepatitis C screen     DEXA (modify frequency per FRAX score)     Annual Wellness Visit (Medicare)     Respiratory Syncytial Virus (RSV) Pregnant or age 60 yrs+ (1 - 1-dose 75+ series)    COVID-19 Vaccine ( season)     Health Maintenance Review: Patient reminded of \"due or due soon\" health maintenance. I have asked the patient to contact his/her primary care provider (PCP) for follow-up on his/her health maintenance.        Wt Readings from Last 3 Encounters:   25 62.1 kg (137 lb)   24 58.1 kg (128 lb)   24 59 kg (130 lb)     Temp Readings from Last 3 Encounters:   25 96.9 °F (36.1 °C) (Temporal)   23 97.6 °F (36.4 °C) (Temporal)     BP Readings from Last 3 Encounters:   25 116/70   24 102/76   24 100/64     Pulse Readings from Last 3 Encounters:   25 66   24 94   24 88       Have you been to the ER, urgent care clinic since your last visit?  Hospitalized since your last visit?   NO    Have you seen or consulted any other health care providers outside our system since your last visit?   NO

## 2025-06-17 NOTE — PROGRESS NOTES
[facility]  [address]  [address]  [place] [address]  Tel: [phone]  Fax: [phone]      Date:  25     Name:  JANNY BERNAL  :  1950  MRN:  [id number]     PCP:  Martha Cottrell MD    Chief Complaint   Patient presents with    Follow-up       HISTORY OF PRESENT ILLNESS:  History of Present Illness  The patient is a 74-year-old female who presents today for a regular follow-up appointment, patient was last seen with Dr. Gavin Rdz 2024 for a notable diagnosis of small fiber neuropathy.    She experiences persistent burning sensations, which intensify when she deviates from her medication schedule. Occasional numbness is also reported. Her current regimen includes duloxetine 90 mg in the morning and gabapentin 600 mg three times daily, which effectively manages her symptoms when taken consistently. She reports no balance issues and maintains an active lifestyle through regular walking and exercise, although her 's recent illness has limited her activity.    She receives Botox injections every three months for headache management, which she finds effective. Typically, she experiences four headache days per month, often triggered by weather changes. These headaches are characterized by intense pressure, akin to an anvil on her head, and are sometimes accompanied by the smell of matches or sulfur. Light and sound sensitivity, nausea, and vomiting are also experienced during these episodes. Severe headaches are managed with eletriptan and Zofran, which she takes as needed. She also finds relief from ice water.    Recently, she has noticed the onset of a minor tremor, particularly noticeable when holding objects. She is not currently on any steroid inhalers or steroids. She has reduced her caffeine intake and primarily consumes water.    SOCIAL HISTORY:    Coffee/Tea/Caffeine-containing Drinks: Reduced intake, primarily consumes

## (undated) DEVICE — TRAP ENDOSCP POLYP 2 CHMBR DRAWER TYP

## (undated) DEVICE — SET ADMIN 16ML TBNG L100IN 2 Y INJ SITE IV PIGGY BK DISP

## (undated) DEVICE — SNARE ENDOSCP POLYP MED STD AD 2.4X27X240 CM 2.8 MM OVL SENS

## (undated) DEVICE — IV START KIT: Brand: MEDLINE

## (undated) DEVICE — SYR 3ML LL TIP 1/10ML GRAD --

## (undated) DEVICE — Device

## (undated) DEVICE — SNARE ENDOSCP M L240CM W27MM SHTH DIA2.4MM CHN 2.8MM OVL

## (undated) DEVICE — BASIN EMSIS 16OZ GRAPHITE PLAS KID SHP MOLD GRAD FOR ORAL

## (undated) DEVICE — ENDOSCOPIC KIT COMPLIANCE ENDOKIT

## (undated) DEVICE — CATH IV AUTOGRD BC PNK 20GA 25 -- INSYTE

## (undated) DEVICE — STRAINER URIN CALC RNL MSH -- CONVERT TO ITEM 357634

## (undated) DEVICE — TRAP,MUCUS SPECIMEN, 80CC: Brand: MEDLINE

## (undated) DEVICE — SYR 10ML LUER LOK 1/5ML GRAD --

## (undated) DEVICE — Z DISCONTINUED PER MEDLINE LINE GAS SAMPLING O2/CO2 LNG AD 13 FT NSL W/ TBNG FILTERLINE

## (undated) DEVICE — NEEDLE HYPO 18GA L1.5IN PNK S STL HUB POLYPR SHLD REG BVL

## (undated) DEVICE — CUFF BLD PRSS AD CLTH SGL TB W/ BAYNT CONN ROUNDED CORNER

## (undated) DEVICE — TIP SUCT TRNSPAR RIB SURF STD BLB RIG NVENT W/ 5IN1 CONN DYND50138] MEDLINE INDUSTRIES INC]

## (undated) DEVICE — NEONATAL-ADULT SPO2 SENSOR: Brand: NELLCOR

## (undated) DEVICE — NON-REM POLYHESIVE PATIENT RETURN ELECTRODE: Brand: VALLEYLAB

## (undated) DEVICE — ELECTRODE PT RET AD L9FT HI MOIST COND ADH HYDRGEL CORDED

## (undated) DEVICE — SOLIDIFIER FLD 2OZ 1500CC N DISINF IN BTL DISP SAFESORB

## (undated) DEVICE — SNARE ENDOSCP AD L240CM LOOP W10MM SHTH DIA2.4MM RND INSUL

## (undated) DEVICE — 1200 GUARD II KIT W/5MM TUBE W/O VAC TUBE: Brand: GUARDIAN

## (undated) DEVICE — CONTAINER SPEC 20 ML LID NEUT BUFF FORMALIN 10 % POLYPR STS

## (undated) DEVICE — SET GRAV CK VLV NEEDLESS ST 3 GANGED 4WAY STPCOCK HI FLO 10

## (undated) DEVICE — ELECTRODE,RADIOTRANSLUCENT,FOAM,5PK: Brand: MEDLINE

## (undated) DEVICE — TOWEL 4 PLY TISS 19X30 SUE WHT